# Patient Record
Sex: MALE | Race: WHITE | Employment: FULL TIME | URBAN - METROPOLITAN AREA
[De-identification: names, ages, dates, MRNs, and addresses within clinical notes are randomized per-mention and may not be internally consistent; named-entity substitution may affect disease eponyms.]

---

## 2017-01-10 ENCOUNTER — OFFICE VISIT (OUTPATIENT)
Dept: FAMILY MEDICINE CLINIC | Facility: CLINIC | Age: 42
End: 2017-01-10

## 2017-01-10 VITALS
WEIGHT: 186.63 LBS | BODY MASS INDEX: 31.09 KG/M2 | HEART RATE: 58 BPM | SYSTOLIC BLOOD PRESSURE: 117 MMHG | HEIGHT: 65 IN | TEMPERATURE: 97 F | DIASTOLIC BLOOD PRESSURE: 71 MMHG

## 2017-01-10 DIAGNOSIS — Z00.00 ROUTINE MEDICAL EXAM: Primary | ICD-10-CM

## 2017-01-10 DIAGNOSIS — R10.30 LOWER ABDOMINAL PAIN: ICD-10-CM

## 2017-01-10 DIAGNOSIS — L83 ACANTHOSIS NIGRICANS: ICD-10-CM

## 2017-01-10 PROCEDURE — 90471 IMMUNIZATION ADMIN: CPT | Performed by: FAMILY MEDICINE

## 2017-01-10 PROCEDURE — 90715 TDAP VACCINE 7 YRS/> IM: CPT | Performed by: FAMILY MEDICINE

## 2017-01-10 PROCEDURE — 99386 PREV VISIT NEW AGE 40-64: CPT | Performed by: FAMILY MEDICINE

## 2017-01-10 NOTE — PROGRESS NOTES
Cydney Carrera is a 37year old male who presents for a complete physical exam.   HPI:   Pt here for regular physical. Reports problems with pain in lower abdomen. No constipation. No diarrhea. Feeling good overall. Reports doing no exercises.  Reports tenderness   MUSCULOSKELETAL: back is not tender,FROM of the back  EXTREMITIES: no cyanosis, clubbing or edema  NEURO: Oriented times three,cranial nerves are intact,motor and sensory are grossly intact    ASSESSMENT AND PLAN:   Jazz Randall is a 37

## 2017-01-14 ENCOUNTER — LAB ENCOUNTER (OUTPATIENT)
Dept: LAB | Age: 42
End: 2017-01-14
Attending: FAMILY MEDICINE
Payer: COMMERCIAL

## 2017-01-14 DIAGNOSIS — R10.30 LOWER ABDOMINAL PAIN: ICD-10-CM

## 2017-01-14 DIAGNOSIS — L83 ACANTHOSIS NIGRICANS: ICD-10-CM

## 2017-01-14 DIAGNOSIS — Z00.00 ROUTINE MEDICAL EXAM: ICD-10-CM

## 2017-01-14 LAB
ALBUMIN SERPL BCP-MCNC: 4.1 G/DL (ref 3.5–4.8)
ALBUMIN/GLOB SERPL: 1.5 {RATIO} (ref 1–2)
ALP SERPL-CCNC: 80 U/L (ref 32–100)
ALT SERPL-CCNC: 26 U/L (ref 17–63)
ANION GAP SERPL CALC-SCNC: 8 MMOL/L (ref 0–18)
AST SERPL-CCNC: 21 U/L (ref 15–41)
BACTERIA UR QL AUTO: NEGATIVE /HPF
BASOPHILS # BLD: 0 K/UL (ref 0–0.2)
BASOPHILS NFR BLD: 1 %
BILIRUB SERPL-MCNC: 1.2 MG/DL (ref 0.3–1.2)
BILIRUB UR QL: NEGATIVE
BUN SERPL-MCNC: 11 MG/DL (ref 8–20)
BUN/CREAT SERPL: 10.9 (ref 10–20)
CALCIUM SERPL-MCNC: 9.1 MG/DL (ref 8.5–10.5)
CHLORIDE SERPL-SCNC: 106 MMOL/L (ref 95–110)
CHOLEST SERPL-MCNC: 162 MG/DL (ref 110–200)
CLARITY UR: CLEAR
CO2 SERPL-SCNC: 24 MMOL/L (ref 22–32)
COLOR UR: YELLOW
CREAT SERPL-MCNC: 1.01 MG/DL (ref 0.5–1.5)
EOSINOPHIL # BLD: 0.2 K/UL (ref 0–0.7)
EOSINOPHIL NFR BLD: 4 %
ERYTHROCYTE [DISTWIDTH] IN BLOOD BY AUTOMATED COUNT: 13 % (ref 11–15)
GLOBULIN PLAS-MCNC: 2.7 G/DL (ref 2.5–3.7)
GLUCOSE SERPL-MCNC: 94 MG/DL (ref 70–99)
GLUCOSE UR-MCNC: NEGATIVE MG/DL
HCT VFR BLD AUTO: 44.8 % (ref 41–52)
HDLC SERPL-MCNC: 41 MG/DL
HGB BLD-MCNC: 15.2 G/DL (ref 13.5–17.5)
LDLC SERPL CALC-MCNC: 104 MG/DL (ref 0–99)
LEUKOCYTE ESTERASE UR QL STRIP.AUTO: NEGATIVE
LYMPHOCYTES # BLD: 1.5 K/UL (ref 1–4)
LYMPHOCYTES NFR BLD: 30 %
MCH RBC QN AUTO: 31.2 PG (ref 27–32)
MCHC RBC AUTO-ENTMCNC: 34 G/DL (ref 32–37)
MCV RBC AUTO: 91.8 FL (ref 80–100)
MONOCYTES # BLD: 0.4 K/UL (ref 0–1)
MONOCYTES NFR BLD: 8 %
NEUTROPHILS # BLD AUTO: 3 K/UL (ref 1.8–7.7)
NEUTROPHILS NFR BLD: 59 %
NITRITE UR QL STRIP.AUTO: NEGATIVE
NONHDLC SERPL-MCNC: 121 MG/DL
OSMOLALITY UR CALC.SUM OF ELEC: 285 MOSM/KG (ref 275–295)
PH UR: 5 [PH] (ref 5–8)
PLATELET # BLD AUTO: 219 K/UL (ref 140–400)
PMV BLD AUTO: 8.5 FL (ref 7.4–10.3)
POTASSIUM SERPL-SCNC: 3.8 MMOL/L (ref 3.3–5.1)
PROT SERPL-MCNC: 6.8 G/DL (ref 5.9–8.4)
PROT UR-MCNC: NEGATIVE MG/DL
RBC # BLD AUTO: 4.88 M/UL (ref 4.5–5.9)
RBC #/AREA URNS AUTO: 4 /HPF
SODIUM SERPL-SCNC: 138 MMOL/L (ref 136–144)
SP GR UR STRIP: 1.02 (ref 1–1.03)
TRIGL SERPL-MCNC: 86 MG/DL (ref 1–149)
TSH SERPL-ACNC: 1.84 UIU/ML (ref 0.34–5.6)
UROBILINOGEN UR STRIP-ACNC: <2
VIT C UR-MCNC: NEGATIVE MG/DL
WBC # BLD AUTO: 5.1 K/UL (ref 4–11)
WBC #/AREA URNS AUTO: <1 /HPF

## 2017-01-14 PROCEDURE — 36415 COLL VENOUS BLD VENIPUNCTURE: CPT

## 2017-01-14 PROCEDURE — 81001 URINALYSIS AUTO W/SCOPE: CPT

## 2017-01-14 PROCEDURE — 85025 COMPLETE CBC W/AUTO DIFF WBC: CPT

## 2017-01-14 PROCEDURE — 83036 HEMOGLOBIN GLYCOSYLATED A1C: CPT

## 2017-01-14 PROCEDURE — 80053 COMPREHEN METABOLIC PANEL: CPT

## 2017-01-14 PROCEDURE — 84443 ASSAY THYROID STIM HORMONE: CPT

## 2017-01-14 PROCEDURE — 80061 LIPID PANEL: CPT

## 2017-01-15 LAB — HBA1C MFR BLD: 5.5 % (ref 4–6)

## 2017-01-19 ENCOUNTER — TELEPHONE (OUTPATIENT)
Dept: FAMILY MEDICINE CLINIC | Facility: CLINIC | Age: 42
End: 2017-01-19

## 2017-01-19 ENCOUNTER — APPOINTMENT (OUTPATIENT)
Dept: LAB | Age: 42
End: 2017-01-19
Attending: FAMILY MEDICINE
Payer: COMMERCIAL

## 2017-01-19 DIAGNOSIS — R31.9 HEMATURIA: ICD-10-CM

## 2017-01-19 DIAGNOSIS — R31.9 HEMATURIA: Primary | ICD-10-CM

## 2017-01-19 LAB
BACTERIA UR QL AUTO: NEGATIVE /HPF
BILIRUB UR QL: NEGATIVE
CLARITY UR: CLEAR
COLOR UR: COLORLESS
GLUCOSE UR-MCNC: NEGATIVE MG/DL
KETONES UR-MCNC: NEGATIVE MG/DL
LEUKOCYTE ESTERASE UR QL STRIP.AUTO: NEGATIVE
NITRITE UR QL STRIP.AUTO: NEGATIVE
PH UR: 6 [PH] (ref 5–8)
PROT UR-MCNC: NEGATIVE MG/DL
RBC #/AREA URNS AUTO: 0 /HPF
SP GR UR STRIP: 1 (ref 1–1.03)
UROBILINOGEN UR STRIP-ACNC: <2
VIT C UR-MCNC: NEGATIVE MG/DL
WBC #/AREA URNS AUTO: 0 /HPF

## 2017-01-19 PROCEDURE — 87086 URINE CULTURE/COLONY COUNT: CPT

## 2017-01-19 PROCEDURE — 81001 URINALYSIS AUTO W/SCOPE: CPT

## 2017-01-19 NOTE — TELEPHONE ENCOUNTER
----- Message from Ramone Adames MD sent at 1/19/2017 10:24 AM CST -----  Normal glucose and no diabetes but at risk given family history. Should make dietary changes as discussed and regular exercise. Overall cholesterol was good.  There was some blood

## 2017-01-19 NOTE — PROGRESS NOTES
Quick Note:    Normal glucose and no diabetes but at risk given family history. Should make dietary changes as discussed and regular exercise. Overall cholesterol was good. There was some blood in his urine. Please have repeat urine - UA and culture.  Dx he

## 2017-01-19 NOTE — TELEPHONE ENCOUNTER
assist:    Pt name &  verified. Result note reviewed per Dr. Gordo Barry. Pt verbalized understanding and denied any further questions at this time.

## 2017-01-19 NOTE — TELEPHONE ENCOUNTER
----- Message from Chares Kehr, MD sent at 1/19/2017 10:24 AM CST -----  Normal glucose and no diabetes but at risk given family history. Should make dietary changes as discussed and regular exercise. Overall cholesterol was good.  There was some blood

## 2017-01-20 NOTE — PROGRESS NOTES
Quick Note:    Tests are all normal.repeat urine was normal. No significant blood.  No further testing needed  ______

## 2017-01-26 ENCOUNTER — TELEPHONE (OUTPATIENT)
Dept: FAMILY MEDICINE CLINIC | Facility: CLINIC | Age: 42
End: 2017-01-26

## 2017-01-26 NOTE — TELEPHONE ENCOUNTER
----- Message from Ad Quispe MD sent at 1/20/2017  6:58 PM CST -----  Tests are all normal. Urine culture negative for bacteria

## 2017-04-11 ENCOUNTER — APPOINTMENT (OUTPATIENT)
Dept: OTHER | Facility: HOSPITAL | Age: 42
End: 2017-04-11
Attending: ORTHOPAEDIC SURGERY
Payer: COMMERCIAL

## 2017-04-19 ENCOUNTER — APPOINTMENT (OUTPATIENT)
Dept: OTHER | Facility: HOSPITAL | Age: 42
End: 2017-04-19
Attending: EMERGENCY MEDICINE

## 2017-07-12 ENCOUNTER — OFFICE VISIT (OUTPATIENT)
Dept: FAMILY MEDICINE CLINIC | Facility: CLINIC | Age: 42
End: 2017-07-12

## 2017-07-12 VITALS
SYSTOLIC BLOOD PRESSURE: 115 MMHG | BODY MASS INDEX: 31 KG/M2 | DIASTOLIC BLOOD PRESSURE: 66 MMHG | WEIGHT: 187.63 LBS | HEART RATE: 62 BPM

## 2017-07-12 DIAGNOSIS — H60.501 ACUTE OTITIS EXTERNA OF RIGHT EAR, UNSPECIFIED TYPE: Primary | ICD-10-CM

## 2017-07-12 PROCEDURE — 99212 OFFICE O/P EST SF 10 MIN: CPT | Performed by: FAMILY MEDICINE

## 2017-07-12 PROCEDURE — 99213 OFFICE O/P EST LOW 20 MIN: CPT | Performed by: FAMILY MEDICINE

## 2017-07-12 RX ORDER — NEOMYCIN SULFATE, POLYMYXIN B SULFATE AND HYDROCORTISONE 10; 3.5; 1 MG/ML; MG/ML; [USP'U]/ML
4 SUSPENSION/ DROPS AURICULAR (OTIC) 3 TIMES DAILY
Qty: 1 BOTTLE | Refills: 0 | Status: SHIPPED | OUTPATIENT
Start: 2017-07-12 | End: 2018-03-05

## 2017-07-12 RX ORDER — AMOXICILLIN 875 MG/1
875 TABLET, COATED ORAL 2 TIMES DAILY
Qty: 20 TABLET | Refills: 0 | Status: SHIPPED | OUTPATIENT
Start: 2017-07-12 | End: 2017-07-22

## 2017-07-12 NOTE — PROGRESS NOTES
Anum Fitzpatrick is a 37year old male. Patient presents with:  Ear Problem    HPI:   3 days ago started with right ear pain and throat pain. Can barely eat now because pain is so bad. No current outpatient prescriptions on file prior to visit.   No

## 2018-03-05 ENCOUNTER — OFFICE VISIT (OUTPATIENT)
Dept: FAMILY MEDICINE CLINIC | Facility: CLINIC | Age: 43
End: 2018-03-05

## 2018-03-05 VITALS
DIASTOLIC BLOOD PRESSURE: 80 MMHG | TEMPERATURE: 98 F | HEIGHT: 65 IN | SYSTOLIC BLOOD PRESSURE: 130 MMHG | BODY MASS INDEX: 32.49 KG/M2 | HEART RATE: 62 BPM | WEIGHT: 195 LBS

## 2018-03-05 DIAGNOSIS — S46.811A STRAIN OF RIGHT TRAPEZIUS MUSCLE, INITIAL ENCOUNTER: ICD-10-CM

## 2018-03-05 DIAGNOSIS — M54.2 NECK PAIN ON RIGHT SIDE: ICD-10-CM

## 2018-03-05 DIAGNOSIS — G44.209 TENSION HEADACHE: Primary | ICD-10-CM

## 2018-03-05 DIAGNOSIS — Z72.820 POOR SLEEP: ICD-10-CM

## 2018-03-05 PROCEDURE — 99214 OFFICE O/P EST MOD 30 MIN: CPT | Performed by: FAMILY MEDICINE

## 2018-03-05 PROCEDURE — 99212 OFFICE O/P EST SF 10 MIN: CPT | Performed by: FAMILY MEDICINE

## 2018-03-05 RX ORDER — DOXEPIN HYDROCHLORIDE 50 MG/1
1 CAPSULE ORAL DAILY
Qty: 30 TABLET | Refills: 11 | Status: SHIPPED | OUTPATIENT
Start: 2018-03-05 | End: 2019-03-16 | Stop reason: ALTCHOICE

## 2018-03-05 RX ORDER — MELOXICAM 15 MG/1
15 TABLET ORAL DAILY
Qty: 30 TABLET | Refills: 1 | Status: SHIPPED | OUTPATIENT
Start: 2018-03-05 | End: 2018-04-04

## 2018-03-05 RX ORDER — CYCLOBENZAPRINE HCL 10 MG
10 TABLET ORAL NIGHTLY
Qty: 30 TABLET | Refills: 1 | Status: SHIPPED | OUTPATIENT
Start: 2018-03-05 | End: 2018-03-25

## 2018-03-06 NOTE — PROGRESS NOTES
Patient ID: Jay Stone is a 40year old male.     HPI  Patient presents with:  Headache  Neck Pain  Throat Problem    He states for 15 days now he has had some right sided headache at the right parietal area that goes to his right neck and right t Tympanic membrane and ear canal normal.   Left Ear: Tympanic membrane and ear canal normal.   Nose: No mucosal edema.    Mouth/Throat: Oropharynx is clear and moist and mucous membranes are normal.   Eyes: Conjunctivae and EOM are normal. Pupils are equal, Oral Tab; Take 1 tablet (15 mg total) by mouth daily. With meals. (pain/inflammation). Strain of right trapezius muscle, initial encounter  -     Cyclobenzaprine HCl 10 MG Oral Tab; Take 1 tablet (10 mg total) by mouth nightly.  As needed for muscle rela

## 2018-03-12 ENCOUNTER — APPOINTMENT (OUTPATIENT)
Dept: GENERAL RADIOLOGY | Age: 43
End: 2018-03-12
Attending: NURSE PRACTITIONER
Payer: COMMERCIAL

## 2018-03-12 ENCOUNTER — HOSPITAL ENCOUNTER (OUTPATIENT)
Age: 43
Discharge: HOME | End: 2018-03-12
Payer: COMMERCIAL

## 2018-03-12 VITALS
DIASTOLIC BLOOD PRESSURE: 85 MMHG | TEMPERATURE: 98 F | WEIGHT: 196 LBS | OXYGEN SATURATION: 99 % | RESPIRATION RATE: 16 BRPM | HEART RATE: 68 BPM | SYSTOLIC BLOOD PRESSURE: 134 MMHG | BODY MASS INDEX: 33 KG/M2

## 2018-03-12 DIAGNOSIS — J40 BRONCHITIS: Primary | ICD-10-CM

## 2018-03-12 LAB — S PYO AG THROAT QL: NEGATIVE

## 2018-03-12 PROCEDURE — 99213 OFFICE O/P EST LOW 20 MIN: CPT

## 2018-03-12 PROCEDURE — 87430 STREP A AG IA: CPT

## 2018-03-12 PROCEDURE — 71046 X-RAY EXAM CHEST 2 VIEWS: CPT | Performed by: NURSE PRACTITIONER

## 2018-03-12 PROCEDURE — 99203 OFFICE O/P NEW LOW 30 MIN: CPT

## 2018-03-12 RX ORDER — ALBUTEROL SULFATE 90 UG/1
2 AEROSOL, METERED RESPIRATORY (INHALATION) EVERY 4 HOURS PRN
Qty: 1 INHALER | Refills: 0 | Status: SHIPPED | OUTPATIENT
Start: 2018-03-12 | End: 2018-04-11

## 2018-03-12 RX ORDER — PREDNISONE 20 MG/1
40 TABLET ORAL DAILY
Qty: 10 TABLET | Refills: 0 | Status: SHIPPED | OUTPATIENT
Start: 2018-03-12 | End: 2018-03-17

## 2018-03-12 RX ORDER — BENZONATATE 100 MG/1
100 CAPSULE ORAL 3 TIMES DAILY PRN
Qty: 30 CAPSULE | Refills: 0 | Status: SHIPPED | OUTPATIENT
Start: 2018-03-12 | End: 2018-04-11

## 2018-03-12 NOTE — ED PROVIDER NOTES
Patient presents with:  Sore Throat  Cough/URI      HPI:     Ky Delgado is a 40year old male who presents with sore throat and productive cough. Patient reports symptoms started 7 days ago.   Now reports cough is more productive of yellow sputum Patient is nontoxic in appearance, afebrile, lungs clear bilaterally with easy respirations. Exam as noted above. Will treat patient with bronchitis with tessalon perles, prednisone and albuterol inhaler.  Pt to follow up with PCP and return for any other

## 2018-03-12 NOTE — ED INITIAL ASSESSMENT (HPI)
1 week with cold symptoms, sore throat, denies fever. Saw Dr. Rex Thacker about shortly before that for a nerve issue in his neck.   Much improved

## 2019-03-16 ENCOUNTER — LAB ENCOUNTER (OUTPATIENT)
Dept: LAB | Age: 44
End: 2019-03-16
Attending: FAMILY MEDICINE
Payer: COMMERCIAL

## 2019-03-16 ENCOUNTER — OFFICE VISIT (OUTPATIENT)
Dept: FAMILY MEDICINE CLINIC | Facility: CLINIC | Age: 44
End: 2019-03-16
Payer: COMMERCIAL

## 2019-03-16 VITALS
DIASTOLIC BLOOD PRESSURE: 72 MMHG | BODY MASS INDEX: 33.09 KG/M2 | WEIGHT: 198.63 LBS | HEART RATE: 77 BPM | SYSTOLIC BLOOD PRESSURE: 123 MMHG | HEIGHT: 65 IN

## 2019-03-16 DIAGNOSIS — G47.00 INSOMNIA, UNSPECIFIED TYPE: ICD-10-CM

## 2019-03-16 DIAGNOSIS — G44.209 TENSION HEADACHE: ICD-10-CM

## 2019-03-16 DIAGNOSIS — Z00.00 ROUTINE MEDICAL EXAM: Primary | ICD-10-CM

## 2019-03-16 DIAGNOSIS — Z00.00 ROUTINE MEDICAL EXAM: ICD-10-CM

## 2019-03-16 LAB
ALBUMIN SERPL-MCNC: 4 G/DL (ref 3.4–5)
ALBUMIN/GLOB SERPL: 1.3 {RATIO} (ref 1–2)
ALP LIVER SERPL-CCNC: 110 U/L (ref 45–117)
ALT SERPL-CCNC: 63 U/L (ref 16–61)
ANION GAP SERPL CALC-SCNC: 6 MMOL/L (ref 0–18)
AST SERPL-CCNC: 19 U/L (ref 15–37)
BASOPHILS # BLD AUTO: 0.05 X10(3) UL (ref 0–0.2)
BASOPHILS NFR BLD AUTO: 0.8 %
BILIRUB SERPL-MCNC: 0.6 MG/DL (ref 0.1–2)
BUN BLD-MCNC: 18 MG/DL (ref 7–18)
BUN/CREAT SERPL: 18 (ref 10–20)
CALCIUM BLD-MCNC: 8.8 MG/DL (ref 8.5–10.1)
CHLORIDE SERPL-SCNC: 110 MMOL/L (ref 98–107)
CHOLEST SMN-MCNC: 174 MG/DL (ref ?–200)
CO2 SERPL-SCNC: 27 MMOL/L (ref 21–32)
CREAT BLD-MCNC: 1 MG/DL (ref 0.7–1.3)
DEPRECATED RDW RBC AUTO: 42 FL (ref 35.1–46.3)
EOSINOPHIL # BLD AUTO: 0.18 X10(3) UL (ref 0–0.7)
EOSINOPHIL NFR BLD AUTO: 2.9 %
ERYTHROCYTE [DISTWIDTH] IN BLOOD BY AUTOMATED COUNT: 12.3 % (ref 11–15)
GLOBULIN PLAS-MCNC: 3.2 G/DL (ref 2.8–4.4)
GLUCOSE BLD-MCNC: 143 MG/DL (ref 70–99)
HCT VFR BLD AUTO: 45.5 % (ref 39–53)
HDLC SERPL-MCNC: 40 MG/DL (ref 40–59)
HGB BLD-MCNC: 14.9 G/DL (ref 13–17.5)
IMM GRANULOCYTES # BLD AUTO: 0.02 X10(3) UL (ref 0–1)
IMM GRANULOCYTES NFR BLD: 0.3 %
LDLC SERPL CALC-MCNC: 93 MG/DL (ref ?–100)
LYMPHOCYTES # BLD AUTO: 1.95 X10(3) UL (ref 1–4)
LYMPHOCYTES NFR BLD AUTO: 31.4 %
M PROTEIN MFR SERPL ELPH: 7.2 G/DL (ref 6.4–8.2)
MCH RBC QN AUTO: 30.5 PG (ref 26–34)
MCHC RBC AUTO-ENTMCNC: 32.7 G/DL (ref 31–37)
MCV RBC AUTO: 93 FL (ref 80–100)
MONOCYTES # BLD AUTO: 0.39 X10(3) UL (ref 0.1–1)
MONOCYTES NFR BLD AUTO: 6.3 %
NEUTROPHILS # BLD AUTO: 3.63 X10 (3) UL (ref 1.5–7.7)
NEUTROPHILS # BLD AUTO: 3.63 X10(3) UL (ref 1.5–7.7)
NEUTROPHILS NFR BLD AUTO: 58.3 %
NONHDLC SERPL-MCNC: 134 MG/DL (ref ?–130)
OSMOLALITY SERPL CALC.SUM OF ELEC: 300 MOSM/KG (ref 275–295)
PLATELET # BLD AUTO: 234 10(3)UL (ref 150–450)
POTASSIUM SERPL-SCNC: 3.8 MMOL/L (ref 3.5–5.1)
RBC # BLD AUTO: 4.89 X10(6)UL (ref 4.3–5.7)
SODIUM SERPL-SCNC: 143 MMOL/L (ref 136–145)
TRIGL SERPL-MCNC: 205 MG/DL (ref 30–149)
TSI SER-ACNC: 1.32 MIU/ML (ref 0.36–3.74)
VIT B12 SERPL-MCNC: 605 PG/ML (ref 193–986)
VLDLC SERPL CALC-MCNC: 41 MG/DL (ref 0–30)
WBC # BLD AUTO: 6.2 X10(3) UL (ref 4–11)

## 2019-03-16 PROCEDURE — 36415 COLL VENOUS BLD VENIPUNCTURE: CPT

## 2019-03-16 PROCEDURE — 99396 PREV VISIT EST AGE 40-64: CPT | Performed by: FAMILY MEDICINE

## 2019-03-16 PROCEDURE — 82607 VITAMIN B-12: CPT

## 2019-03-16 PROCEDURE — 82306 VITAMIN D 25 HYDROXY: CPT

## 2019-03-16 PROCEDURE — 80061 LIPID PANEL: CPT

## 2019-03-16 PROCEDURE — 85025 COMPLETE CBC W/AUTO DIFF WBC: CPT

## 2019-03-16 PROCEDURE — 80053 COMPREHEN METABOLIC PANEL: CPT

## 2019-03-16 PROCEDURE — 84443 ASSAY THYROID STIM HORMONE: CPT

## 2019-03-16 RX ORDER — ROPINIROLE 1 MG/1
1 TABLET, FILM COATED ORAL NIGHTLY
Qty: 30 TABLET | Refills: 1 | Status: SHIPPED | OUTPATIENT
Start: 2019-03-16 | End: 2019-04-10 | Stop reason: ALTCHOICE

## 2019-03-16 RX ORDER — MAGNESIUM 200 MG
1000 TABLET ORAL DAILY
Qty: 90 TABLET | Refills: 1 | Status: SHIPPED | OUTPATIENT
Start: 2019-03-16 | End: 2019-04-10 | Stop reason: ALTCHOICE

## 2019-03-16 RX ORDER — CHOLECALCIFEROL (VITAMIN D3) 50 MCG
2000 TABLET ORAL DAILY
Qty: 90 TABLET | Refills: 4 | Status: SHIPPED | OUTPATIENT
Start: 2019-03-16 | End: 2019-04-10 | Stop reason: ALTCHOICE

## 2019-03-16 NOTE — PROGRESS NOTES
Milena Edwards is a 44year old male who presents for a complete physical exam.   HPI:   Pt here for regular physical. Reports eating healthy per pt. Reports eating salads. Not exercising. Reports some pain with working in his legs.    Not sleeping w tenderness  LUNGS: clear to auscultation  CARDIO: RRR without murmur  GI: good BS's,no masses, HSM or tenderness  MUSCULOSKELETAL: back is not tender,FROM of the back  EXTREMITIES: no cyanosis, clubbing or edema  NEURO: Oriented times three,cranial nerves

## 2019-03-18 LAB — 25(OH)D3 SERPL-MCNC: 14.9 NG/ML (ref 30–100)

## 2019-03-25 NOTE — PROGRESS NOTES
Vitamin D levels are low. Pt to take weekly vit d 50,000 for 3 months and when complete continue with the vitamin D daily over the counter 2000 units. And pt has high glucose - levels of someone that is diabetic.  Pt to go for another lab -called hemoglob

## 2019-03-25 NOTE — PROGRESS NOTES
Should stop the 2000 for now and restart when done with 3 months of vitamin d high dose.  Continue B12

## 2019-03-27 ENCOUNTER — LAB ENCOUNTER (OUTPATIENT)
Dept: LAB | Age: 44
End: 2019-03-27
Attending: FAMILY MEDICINE
Payer: COMMERCIAL

## 2019-03-27 DIAGNOSIS — R73.09 ABNORMAL GLUCOSE: ICD-10-CM

## 2019-03-27 PROCEDURE — 83036 HEMOGLOBIN GLYCOSYLATED A1C: CPT

## 2019-03-27 PROCEDURE — 36415 COLL VENOUS BLD VENIPUNCTURE: CPT

## 2019-04-02 NOTE — PROGRESS NOTES
Pt is pre-diabetic - does not have diabetes yet but needs to make sure changes to diet to avoid it. Smaller portions - more lean meats, veggies. Less carbs.

## 2019-04-10 ENCOUNTER — OFFICE VISIT (OUTPATIENT)
Dept: FAMILY MEDICINE CLINIC | Facility: CLINIC | Age: 44
End: 2019-04-10
Payer: COMMERCIAL

## 2019-04-10 VITALS
HEART RATE: 58 BPM | DIASTOLIC BLOOD PRESSURE: 63 MMHG | BODY MASS INDEX: 33.19 KG/M2 | WEIGHT: 199.19 LBS | SYSTOLIC BLOOD PRESSURE: 106 MMHG | HEIGHT: 65 IN

## 2019-04-10 DIAGNOSIS — E56.9 VITAMIN DEFICIENCY: ICD-10-CM

## 2019-04-10 DIAGNOSIS — R73.03 PREDIABETES: Primary | ICD-10-CM

## 2019-04-10 PROCEDURE — 99212 OFFICE O/P EST SF 10 MIN: CPT | Performed by: FAMILY MEDICINE

## 2019-04-10 PROCEDURE — 99213 OFFICE O/P EST LOW 20 MIN: CPT | Performed by: FAMILY MEDICINE

## 2019-04-10 RX ORDER — CHOLECALCIFEROL (VITAMIN D3) 1250 MCG
1 CAPSULE ORAL WEEKLY
Qty: 12 CAPSULE | Refills: 1 | Status: SHIPPED | OUTPATIENT
Start: 2019-04-10 | End: 2020-01-27 | Stop reason: ALTCHOICE

## 2019-04-10 NOTE — PROGRESS NOTES
Nicole Siegel is a 44year old male. Patient presents with:  Lab Results    HPI:   Here for follow up on his lab results.    Reports feeling a bit better     Current Outpatient Medications on File Prior to Visit:  Cholecalciferol (VITAMIN D3) 46647 un

## 2020-01-25 ENCOUNTER — HOSPITAL ENCOUNTER (EMERGENCY)
Facility: HOSPITAL | Age: 45
Discharge: HOME | End: 2020-01-25
Attending: EMERGENCY MEDICINE
Payer: COMMERCIAL

## 2020-01-25 ENCOUNTER — APPOINTMENT (OUTPATIENT)
Dept: GENERAL RADIOLOGY | Facility: HOSPITAL | Age: 45
End: 2020-01-25
Attending: EMERGENCY MEDICINE
Payer: COMMERCIAL

## 2020-01-25 VITALS
WEIGHT: 188 LBS | RESPIRATION RATE: 18 BRPM | DIASTOLIC BLOOD PRESSURE: 79 MMHG | HEIGHT: 65 IN | BODY MASS INDEX: 31.32 KG/M2 | TEMPERATURE: 98 F | SYSTOLIC BLOOD PRESSURE: 121 MMHG | HEART RATE: 56 BPM | OXYGEN SATURATION: 99 %

## 2020-01-25 DIAGNOSIS — S61.412A LACERATION OF LEFT HAND WITHOUT FOREIGN BODY, INITIAL ENCOUNTER: Primary | ICD-10-CM

## 2020-01-25 PROCEDURE — 12032 INTMD RPR S/A/T/EXT 2.6-7.5: CPT

## 2020-01-25 PROCEDURE — 90471 IMMUNIZATION ADMIN: CPT

## 2020-01-25 PROCEDURE — 99283 EMERGENCY DEPT VISIT LOW MDM: CPT

## 2020-01-25 PROCEDURE — 73130 X-RAY EXAM OF HAND: CPT | Performed by: EMERGENCY MEDICINE

## 2020-01-25 RX ORDER — LIDOCAINE HYDROCHLORIDE AND EPINEPHRINE 20; 5 MG/ML; UG/ML
20 INJECTION, SOLUTION EPIDURAL; INFILTRATION; INTRACAUDAL; PERINEURAL ONCE
Status: COMPLETED | OUTPATIENT
Start: 2020-01-25 | End: 2020-01-25

## 2020-01-25 NOTE — ED NOTES
Discharge instructions given to patient. Verbalized understanding. Patient in no distress.   Patient left ED ambulatory steady gait

## 2020-01-25 NOTE — ED PROVIDER NOTES
Patient Seen in: [facility] AND [facility] Emergency Department      History   Patient presents with:  Laceration Abrasion    Stated Complaint:     HPI    30-year-old male with past medical history significant for prediabetes presents the emergency department fo pulses  Pulmonary/Chest: CTA b/l with no rales, wheezes. No chest wall tenderness  Abdominal: Nontender. Nondistended. Soft. Bowel sounds are normal.   Back:   : Musculoskeletal: Left hand with normal range of motion. No deformity.   There is a 5 cm l Prescribed:  Current Discharge Medication List

## 2020-01-27 ENCOUNTER — OFFICE VISIT (OUTPATIENT)
Dept: FAMILY MEDICINE CLINIC | Facility: CLINIC | Age: 45
End: 2020-01-27
Payer: COMMERCIAL

## 2020-01-27 ENCOUNTER — TELEPHONE (OUTPATIENT)
Dept: FAMILY MEDICINE CLINIC | Facility: CLINIC | Age: 45
End: 2020-01-27

## 2020-01-27 VITALS
DIASTOLIC BLOOD PRESSURE: 62 MMHG | HEART RATE: 65 BPM | BODY MASS INDEX: 32.49 KG/M2 | WEIGHT: 195 LBS | HEIGHT: 65 IN | SYSTOLIC BLOOD PRESSURE: 107 MMHG

## 2020-01-27 DIAGNOSIS — S61.429D: Primary | ICD-10-CM

## 2020-01-27 PROCEDURE — 99213 OFFICE O/P EST LOW 20 MIN: CPT | Performed by: FAMILY MEDICINE

## 2020-01-27 PROCEDURE — 1111F DSCHRG MED/CURRENT MED MERGE: CPT | Performed by: FAMILY MEDICINE

## 2020-01-27 RX ORDER — AMOXICILLIN AND CLAVULANATE POTASSIUM 875; 125 MG/1; MG/1
1 TABLET, FILM COATED ORAL 2 TIMES DAILY
Qty: 20 TABLET | Refills: 0 | Status: SHIPPED | OUTPATIENT
Start: 2020-01-27 | End: 2020-02-06

## 2020-01-27 NOTE — TELEPHONE ENCOUNTER
ER 1/25/2020. Moved table, cut hand on glass. Left hand dorsum 5 cm wound. Today patient said hand feels more swollen than on Saturday, able to close hand Saturday but cannot today. Also had pain Saturday but today has pain in fingers too.  Left hand is

## 2020-01-27 NOTE — PROGRESS NOTES
Ban Sales is a 38year old male.  Patient presents with:  Hospital F/U: left hand injury     HPI:   \" 52-year-old male with past medical history significant for prediabetes presents the emergency department for evaluation of laceration to his lef these issues and agrees to the plan.       Hanna Gray MD  1/27/2020  2:21 PM

## 2020-02-03 ENCOUNTER — OFFICE VISIT (OUTPATIENT)
Dept: SURGERY | Facility: CLINIC | Age: 45
End: 2020-02-03
Payer: COMMERCIAL

## 2020-02-03 ENCOUNTER — OFFICE VISIT (OUTPATIENT)
Dept: FAMILY MEDICINE CLINIC | Facility: CLINIC | Age: 45
End: 2020-02-03
Payer: COMMERCIAL

## 2020-02-03 VITALS
SYSTOLIC BLOOD PRESSURE: 131 MMHG | HEIGHT: 65 IN | DIASTOLIC BLOOD PRESSURE: 78 MMHG | HEART RATE: 89 BPM | BODY MASS INDEX: 32 KG/M2

## 2020-02-03 DIAGNOSIS — S61.412D LACERATION OF LEFT HAND WITHOUT FOREIGN BODY, SUBSEQUENT ENCOUNTER: Primary | ICD-10-CM

## 2020-02-03 DIAGNOSIS — S61.412A LACERATION WITHOUT FOREIGN BODY OF LEFT HAND, INITIAL ENCOUNTER: Primary | ICD-10-CM

## 2020-02-03 DIAGNOSIS — S61.412A LACERATION OF FINGER OF LEFT HAND WITH COMPLICATION, INITIAL ENCOUNTER: Primary | ICD-10-CM

## 2020-02-03 PROCEDURE — 29125 APPL SHORT ARM SPLINT STATIC: CPT | Performed by: OCCUPATIONAL THERAPIST

## 2020-02-03 PROCEDURE — 99243 OFF/OP CNSLTJ NEW/EST LOW 30: CPT | Performed by: PLASTIC SURGERY

## 2020-02-03 PROCEDURE — 99213 OFFICE O/P EST LOW 20 MIN: CPT | Performed by: NURSE PRACTITIONER

## 2020-02-03 NOTE — PROGRESS NOTES
Subjective: A piece of glass cut my hand. Objective:     Current level of performance:   ADL: Independent  Work: Restricted work duty. Leisure: Not addressed.     Measurements/Tests:  ROM:         N/A         Treatment Provided this day: Fabricated a

## 2020-02-03 NOTE — PATIENT INSTRUCTIONS
Laceración de la mano con posible lesión nerviosa (suturas, pegamento para piel)    Becky laceración es becky cortada. Es posible que becky laceración de la mano lesione un nervio.  Karina tipo de lesión puede ocasionar entumecimiento, pérdida de sensación y pao Cuidados generales:  · Siga las instrucciones de jesus proveedor de atención médica para cuidar de jesus cortada. · [employer] las hi con Qu[place]ult y jabón antes y después de cuidar la herida.  Carolina ayuda a evitar tracee infección.    · Deje el vendaje original · Si la claudia se humedece, séquela dándole golpecitos con un paño limpio. Luego, cambie el vendaje humedecido por otro Pervis Pagoda de control  Programe tracee visita de control con jesus proveedor de [employer].  Es importante que lo examinen nuevamente par 3. Puede ducharse y bañarse de la manera habitual. Ahora sí puede ir a nadar si lo desea. Programe tracee VISITA DE CONTROL con jesus propio médico si tiene algún problema.   Busque Prontamente Atención Médica  si algo de lo siguiente ocurre:  · Dolor que aument

## 2020-02-03 NOTE — PROGRESS NOTES
HPI  Pt here for removal of 8 sutures from left hand. Was seen in ER on 1/25/2020-cut hand on glass windmill falling on hand. 8 external and 1 internal sutures placed.      Review of Systems     02/03/20  0842   BP: 131/78   Pulse: 89   Height: 5' 5\" (1. Forced sexual activity: Not on file    Other Topics      Concerns:        Left Handed: Not Asked        Right Handed: Yes        Currently spends a great deal of time in the sun: Not Asked        Past Sunlamp Treatments for Acne: Not Asked        His

## 2020-02-03 NOTE — PROCEDURES
Laceration noted to dorsum of left hand- 8 sutures in place; no erythema or discharge noted. Sutures removed easily-wound is not well approximated-steri strips applied. Triple anbx ointment applied and wound dressed. Aftercare instructions given to pt.  A

## 2020-02-03 NOTE — ASSESSMENT & PLAN NOTE
8 ext sutures removed  Steri strips applied   Refer hand specialist due to numbness on hand and finger and pain in wrist.

## 2020-02-03 NOTE — H&P
Injury 1: Laceration Dorsal Left Hand  - Date: 01/25/20  - Days Since: 30 Sandra Mann is a 38year old male that presents with Patient presents with:  Laceration: Dorsal Left Hand  .     REFERRED BY:  Sana Royal      Pacemaker: No  Latex Al Amoxicillin-Pot Clavulanate 875-125 MG Oral Tab Take 1 tablet by mouth 2 (two) times daily for 10 days. 20 tablet 0       Allergies:   No Known Allergies    No past medical history on file.   Past Surgical History:   Procedure Laterality Date   • HERNIA JENNIFER sunburns in the past: Not Asked        Tanning Salons in the Past: Not Asked        Hx of Radiation Treatments: Not Asked        Regular use of sun block: Not Asked    Social History Narrative      Not on file    No family history on file.     PHYSICAL EXAM

## 2020-02-05 ENCOUNTER — TELEPHONE (OUTPATIENT)
Dept: SURGERY | Facility: CLINIC | Age: 45
End: 2020-02-05

## 2020-02-05 ENCOUNTER — OFFICE VISIT (OUTPATIENT)
Dept: SURGERY | Facility: CLINIC | Age: 45
End: 2020-02-05
Payer: COMMERCIAL

## 2020-02-05 DIAGNOSIS — M62.81 DISTAL MUSCLE WEAKNESS: ICD-10-CM

## 2020-02-05 DIAGNOSIS — M25.642 STIFFNESS OF JOINT, HAND, LEFT: Primary | ICD-10-CM

## 2020-02-05 PROCEDURE — 97760 ORTHOTIC MGMT&TRAING 1ST ENC: CPT | Performed by: OCCUPATIONAL THERAPIST

## 2020-02-05 NOTE — PROGRESS NOTES
Subjective:  My splint is too tight   .       Objective:     Current level of performance:  ADL: Independent  Work: N/A  Leisure: N/A    Measurements/Tests:  ROM:         N/A         Treatment Provided this day: Adjusted left resting hand splint, re-wrapped

## 2020-02-10 ENCOUNTER — OFFICE VISIT (OUTPATIENT)
Dept: SURGERY | Facility: CLINIC | Age: 45
End: 2020-02-10
Payer: COMMERCIAL

## 2020-02-10 ENCOUNTER — APPOINTMENT (OUTPATIENT)
Dept: SURGERY | Facility: CLINIC | Age: 45
End: 2020-02-10
Payer: COMMERCIAL

## 2020-02-10 DIAGNOSIS — S61.412A LACERATION OF FINGER OF LEFT HAND WITH COMPLICATION, INITIAL ENCOUNTER: Primary | ICD-10-CM

## 2020-02-10 DIAGNOSIS — M62.81 DISTAL MUSCLE WEAKNESS: ICD-10-CM

## 2020-02-10 DIAGNOSIS — M25.642 STIFFNESS OF JOINT, HAND, LEFT: Primary | ICD-10-CM

## 2020-02-10 PROCEDURE — 97166 OT EVAL MOD COMPLEX 45 MIN: CPT | Performed by: OCCUPATIONAL THERAPIST

## 2020-02-10 PROCEDURE — 97110 THERAPEUTIC EXERCISES: CPT | Performed by: OCCUPATIONAL THERAPIST

## 2020-02-10 PROCEDURE — 99212 OFFICE O/P EST SF 10 MIN: CPT | Performed by: PLASTIC SURGERY

## 2020-02-10 NOTE — PROGRESS NOTES
Injury 1: Laceration Dorsal Left Hand with DSU branch laceration  - Date: 01/25/20  - Days Since: 16    Pt denies pain or s/s of infection  He denies numbness or tingling  Pt has kept resting hand splint on at all times as directed  Had dressing adjustment

## 2020-02-12 NOTE — TELEPHONE ENCOUNTER
Dr. Carrie Berumen,    Please sign off on form:  -Highlight the patient and hit \"Chart\" button. -In Chart Review, w/in the Encounter tab - click 1 time on the Telephone call encounter for 2/5/20.  Scroll down the telephone encounter.  -Click \"scan on\" blue DISPLAY PLAN FREE TEXT DISPLAY PLAN FREE TEXT

## 2020-02-13 ENCOUNTER — OFFICE VISIT (OUTPATIENT)
Dept: SURGERY | Facility: CLINIC | Age: 45
End: 2020-02-13
Payer: COMMERCIAL

## 2020-02-13 DIAGNOSIS — M25.642 STIFFNESS OF JOINT, HAND, LEFT: Primary | ICD-10-CM

## 2020-02-13 DIAGNOSIS — M62.81 DISTAL MUSCLE WEAKNESS: ICD-10-CM

## 2020-02-13 PROCEDURE — 97166 OT EVAL MOD COMPLEX 45 MIN: CPT | Performed by: OCCUPATIONAL THERAPIST

## 2020-02-13 PROCEDURE — 97110 THERAPEUTIC EXERCISES: CPT | Performed by: OCCUPATIONAL THERAPIST

## 2020-02-13 PROCEDURE — 97022 WHIRLPOOL THERAPY: CPT | Performed by: OCCUPATIONAL THERAPIST

## 2020-02-13 NOTE — PROGRESS NOTES
OCCUPATIONAL THERAPY EVALUATION:   Antonia Durbin   [id number]       SUBJECTIVE:    HX of Injury: Left hand dorsal surface laceration from a piece of glass. Chief Complaint:   Left hand and wrist tightness. .    Precautions: No work involving the Left Activities  Modalities  Scar Management  Patient/Family Education  Splinting: Left resting hand splint. GOALS:  Short term goals to be reached in x 1 week: Independent with HEP. Jason Margaret Jason Margaret 2) Increased MP AROM x 15 - 20 degrees .     Increased PIP AROM  X 1

## 2020-02-14 NOTE — TELEPHONE ENCOUNTER
Emailed completed FMLA to pt's HR: [email or web address]. Pt picking up at [facility]. [employer] PSR notified pt.

## 2020-02-17 ENCOUNTER — OFFICE VISIT (OUTPATIENT)
Dept: SURGERY | Facility: CLINIC | Age: 45
End: 2020-02-17
Payer: COMMERCIAL

## 2020-02-17 DIAGNOSIS — M25.642 STIFFNESS OF JOINT, HAND, LEFT: Primary | ICD-10-CM

## 2020-02-17 DIAGNOSIS — S61.412A LACERATION OF FINGER OF LEFT HAND WITH COMPLICATION, INITIAL ENCOUNTER: Primary | ICD-10-CM

## 2020-02-17 DIAGNOSIS — M62.81 DISTAL MUSCLE WEAKNESS: ICD-10-CM

## 2020-02-17 PROCEDURE — 97110 THERAPEUTIC EXERCISES: CPT | Performed by: OCCUPATIONAL THERAPIST

## 2020-02-17 PROCEDURE — 99212 OFFICE O/P EST SF 10 MIN: CPT | Performed by: PLASTIC SURGERY

## 2020-02-17 NOTE — PROGRESS NOTES
Subjective: I am ready to return to work. Objective:     Current level of performance:  ADL: Independent  Work: Returning to full duty.   Leisure: Family    Measurements/Tests:  ROM:  Testing By: nicko   Strength Right: 60 #      Strength Left: 4

## 2020-02-17 NOTE — PROGRESS NOTES
Injury 1: Laceration Dorsal Left Hand with DSU branch laceration  - Date: 01/25/20  - Days Since: 21   Pt Khmer speaking  used. \"Doing better\"  Intermittent dull pain. Rates pain 5/10. Not taking analgesics. Denies numbness and tingling.
Dr. Nelson

## 2020-10-13 ENCOUNTER — TELEPHONE (OUTPATIENT)
Dept: FAMILY MEDICINE CLINIC | Facility: CLINIC | Age: 45
End: 2020-10-13

## 2020-10-13 NOTE — TELEPHONE ENCOUNTER
Called patient to reschedule physical due to doctor not being available. Spoke with wife and requesting to know if patient is able to get physical for a sooner date, states per insurance  he needs physical before the end of the year. Are we gal to accommodate for a sooner date?

## 2020-11-11 ENCOUNTER — OFFICE VISIT (OUTPATIENT)
Dept: FAMILY MEDICINE CLINIC | Facility: CLINIC | Age: 45
End: 2020-11-11
Payer: COMMERCIAL

## 2020-11-11 VITALS
DIASTOLIC BLOOD PRESSURE: 60 MMHG | WEIGHT: 201 LBS | SYSTOLIC BLOOD PRESSURE: 130 MMHG | BODY MASS INDEX: 33.49 KG/M2 | TEMPERATURE: 97 F | HEIGHT: 65 IN | HEART RATE: 55 BPM

## 2020-11-11 DIAGNOSIS — Z00.00 ROUTINE MEDICAL EXAM: Primary | ICD-10-CM

## 2020-11-11 DIAGNOSIS — G89.29 CHRONIC PAIN OF BOTH KNEES: ICD-10-CM

## 2020-11-11 DIAGNOSIS — M25.561 CHRONIC PAIN OF BOTH KNEES: ICD-10-CM

## 2020-11-11 DIAGNOSIS — G47.00 INSOMNIA, UNSPECIFIED TYPE: ICD-10-CM

## 2020-11-11 DIAGNOSIS — M25.562 CHRONIC PAIN OF BOTH KNEES: ICD-10-CM

## 2020-11-11 PROCEDURE — 99072 ADDL SUPL MATRL&STAF TM PHE: CPT | Performed by: FAMILY MEDICINE

## 2020-11-11 PROCEDURE — 3078F DIAST BP <80 MM HG: CPT | Performed by: FAMILY MEDICINE

## 2020-11-11 PROCEDURE — 3008F BODY MASS INDEX DOCD: CPT | Performed by: FAMILY MEDICINE

## 2020-11-11 PROCEDURE — 99396 PREV VISIT EST AGE 40-64: CPT | Performed by: FAMILY MEDICINE

## 2020-11-11 PROCEDURE — 3075F SYST BP GE 130 - 139MM HG: CPT | Performed by: FAMILY MEDICINE

## 2020-11-11 RX ORDER — NAPROXEN 500 MG/1
500 TABLET ORAL 2 TIMES DAILY WITH MEALS
Qty: 60 TABLET | Refills: 0 | Status: SHIPPED | OUTPATIENT
Start: 2020-11-11

## 2020-11-11 RX ORDER — CYCLOBENZAPRINE HCL 5 MG
5 TABLET ORAL NIGHTLY
Qty: 30 TABLET | Refills: 1 | Status: SHIPPED | OUTPATIENT
Start: 2020-11-11

## 2020-11-12 NOTE — PROGRESS NOTES
Suemet Engel is a 41year old male who presents for a complete physical exam.   HPI:   Pt here for regular physical. Gaining weight. Reports pain in both knees and hands. Reports almost daily pain.  Reports job can be physical - mostly standing bu auscultation  CARDIO: RRR without murmur  GI: good BS's,no masses, HSM or tenderness  EXTREMITIES: no cyanosis, clubbing or edema  NEURO: Oriented times three,cranial nerves are intact,motor and sensory are grossly intact    ASSESSMENT AND PLAN:   Gómez Adkins

## 2020-11-30 ENCOUNTER — TELEPHONE (OUTPATIENT)
Dept: FAMILY MEDICINE CLINIC | Facility: CLINIC | Age: 45
End: 2020-11-30

## 2020-12-01 NOTE — TELEPHONE ENCOUNTER
Patient came in to office. Brought in annual preventive exam form to by signed. Physical was on 11/11/20. Form signed by Dr. Wayne Samuel.
Patient in [employer] requesting proof that he had PX done for work.   Please advs
complains of pain/discomfort

## 2020-12-02 NOTE — TELEPHONE ENCOUNTER
Pt brought FMLA Forms to  [facility] for Dr María Laws to complete. Pt signed HIPPA and pd $25 fee. Pt stressed he needs asap and was informed it usually takes 10-14 business days. Scanned to [facility] and brought originals to [facility] @ [facility]. [Follow-Up: _____] : a [unfilled] follow-up visit

## 2020-12-05 ENCOUNTER — LAB ENCOUNTER (OUTPATIENT)
Dept: LAB | Age: 45
End: 2020-12-05
Attending: FAMILY MEDICINE
Payer: COMMERCIAL

## 2020-12-05 DIAGNOSIS — G89.29 CHRONIC PAIN OF BOTH KNEES: ICD-10-CM

## 2020-12-05 DIAGNOSIS — Z00.00 ROUTINE MEDICAL EXAM: ICD-10-CM

## 2020-12-05 DIAGNOSIS — M25.561 CHRONIC PAIN OF BOTH KNEES: ICD-10-CM

## 2020-12-05 DIAGNOSIS — M25.562 CHRONIC PAIN OF BOTH KNEES: ICD-10-CM

## 2020-12-05 PROCEDURE — 83036 HEMOGLOBIN GLYCOSYLATED A1C: CPT

## 2020-12-05 PROCEDURE — 84443 ASSAY THYROID STIM HORMONE: CPT

## 2020-12-05 PROCEDURE — 80053 COMPREHEN METABOLIC PANEL: CPT

## 2020-12-05 PROCEDURE — 85025 COMPLETE CBC W/AUTO DIFF WBC: CPT

## 2020-12-05 PROCEDURE — 86431 RHEUMATOID FACTOR QUANT: CPT

## 2020-12-05 PROCEDURE — 36415 COLL VENOUS BLD VENIPUNCTURE: CPT

## 2020-12-05 PROCEDURE — 80061 LIPID PANEL: CPT

## 2020-12-08 NOTE — PROGRESS NOTES
Pt is in the pre-diabetes range - does not have it yet but needs to make dietary changes for weight loss and start regular exercise. Will monitor yearly. Rest of the labs were good.

## 2020-12-09 NOTE — PROGRESS NOTES
OCCUPATIONAL THERAPY EVALUATION:   Felisha Faulkner   [id number]       SUBJECTIVE:    HX of Injury: Left hand dorsal surface laceration from a piece of glass. Chief Complaint:   Left hand and wrist tightness. .    Precautions: No work involving the left Deann Bailon MD [Normal] : orientated to person, place, and time [de-identified] : reg, + murmur [de-identified] : trace edema, + rt foot wound [General Appearance - Well Developed] : well developed [Normal Appearance] : normal appearance [Well Groomed] : well groomed [General Appearance - Well Nourished] : well nourished [No Deformities] : no deformities [General Appearance - In No Acute Distress] : no acute distress [Normal Conjunctiva] : the conjunctiva exhibited no abnormalities [Eyelids - No Xanthelasma] : the eyelids demonstrated no xanthelasmas [Normal Oral Mucosa] : normal oral mucosa [No Oral Pallor] : no oral pallor [No Oral Cyanosis] : no oral cyanosis [Normal Jugular Venous A Waves Present] : normal jugular venous A waves present [Normal Jugular Venous V Waves Present] : normal jugular venous V waves present [No Jugular Venous Celis A Waves] : no jugular venous celis A waves [Respiration, Rhythm And Depth] : normal respiratory rhythm and effort [Exaggerated Use Of Accessory Muscles For Inspiration] : no accessory muscle use [Auscultation Breath Sounds / Voice Sounds] : lungs were clear to auscultation bilaterally [Heart Rate And Rhythm] : heart rate and rhythm were normal [Heart Sounds] : normal S1 and S2 [Murmurs] : no murmurs present [Abdomen Soft] : soft [Abdomen Tenderness] : non-tender [Abdomen Mass (___ Cm)] : no abdominal mass palpated [Abnormal Walk] : normal gait [Gait - Sufficient For Exercise Testing] : the gait was sufficient for exercise testing [Nail Clubbing] : no clubbing of the fingernails [Cyanosis, Localized] : no localized cyanosis [Petechial Hemorrhages (___cm)] : no petechial hemorrhages [Skin Color & Pigmentation] : normal skin color and pigmentation [] : no rash [No Venous Stasis] : no venous stasis [No Xanthoma] : no  xanthoma was observed [FreeTextEntry1] : Rt shin  large wound from trip and fall, Rt foot ulcer on bottom, healing [Oriented To Time, Place, And Person] : oriented to person, place, and time [Affect] : the affect was normal [Mood] : the mood was normal [No Anxiety] : not feeling anxious

## 2021-03-08 ENCOUNTER — TELEPHONE (OUTPATIENT)
Dept: FAMILY MEDICINE CLINIC | Facility: CLINIC | Age: 46
End: 2021-03-08

## 2021-03-08 DIAGNOSIS — Z11.52 ENCOUNTER FOR SCREENING FOR COVID-19: Primary | ICD-10-CM

## 2021-03-08 NOTE — TELEPHONE ENCOUNTER
Pt is requesting order for Covid testing in order for him to be able to travel out of the country. Please advise.

## 2021-03-09 NOTE — TELEPHONE ENCOUNTER
Called patient verified full name and . Informed patient that test was order, provided ph# to central scheduling.

## 2021-10-26 ENCOUNTER — OFFICE VISIT (OUTPATIENT)
Dept: FAMILY MEDICINE CLINIC | Facility: CLINIC | Age: 46
End: 2021-10-26
Payer: COMMERCIAL

## 2021-10-26 VITALS
TEMPERATURE: 97 F | WEIGHT: 194.19 LBS | DIASTOLIC BLOOD PRESSURE: 63 MMHG | HEART RATE: 59 BPM | SYSTOLIC BLOOD PRESSURE: 110 MMHG | BODY MASS INDEX: 32.35 KG/M2 | HEIGHT: 65 IN

## 2021-10-26 DIAGNOSIS — J01.00 ACUTE MAXILLARY SINUSITIS, RECURRENCE NOT SPECIFIED: ICD-10-CM

## 2021-10-26 DIAGNOSIS — J02.9 SORE THROAT: ICD-10-CM

## 2021-10-26 DIAGNOSIS — G44.209 TENSION HEADACHE: Primary | ICD-10-CM

## 2021-10-26 PROCEDURE — 3078F DIAST BP <80 MM HG: CPT | Performed by: FAMILY MEDICINE

## 2021-10-26 PROCEDURE — 3008F BODY MASS INDEX DOCD: CPT | Performed by: FAMILY MEDICINE

## 2021-10-26 PROCEDURE — 3074F SYST BP LT 130 MM HG: CPT | Performed by: FAMILY MEDICINE

## 2021-10-26 PROCEDURE — 99213 OFFICE O/P EST LOW 20 MIN: CPT | Performed by: FAMILY MEDICINE

## 2021-10-26 PROCEDURE — 87880 STREP A ASSAY W/OPTIC: CPT | Performed by: FAMILY MEDICINE

## 2021-10-26 RX ORDER — FLUTICASONE PROPIONATE 50 MCG
2 SPRAY, SUSPENSION (ML) NASAL DAILY
Qty: 18 ML | Refills: 0 | Status: SHIPPED | OUTPATIENT
Start: 2021-10-26 | End: 2022-10-21

## 2021-10-26 RX ORDER — LEVOCETIRIZINE DIHYDROCHLORIDE 5 MG/1
5 TABLET, FILM COATED ORAL EVERY EVENING
Qty: 30 TABLET | Refills: 0 | Status: SHIPPED | OUTPATIENT
Start: 2021-10-26

## 2021-10-26 RX ORDER — AMOXICILLIN AND CLAVULANATE POTASSIUM 875; 125 MG/1; MG/1
1 TABLET, FILM COATED ORAL 2 TIMES DAILY
Qty: 20 TABLET | Refills: 0 | Status: SHIPPED | OUTPATIENT
Start: 2021-10-26 | End: 2021-11-05

## 2021-10-26 NOTE — PROGRESS NOTES
Ashli Leslie is a 40year old male. Patient presents with:  Headache: chronic x 5-6 months  Sore Throat: x 2 weeks    HPI:   Reports about 6 months - pain at back of head and at forehead and dizziness at times.  Takes medications and improves - tylen edema      ASSESSMENT AND PLAN:   1. Tension headache      2. Sore throat    - STREP A ASSAY W/OPTIC    3. Acute maxillary sinusitis, recurrence not specified  tx with augmentin BID x 10 days, xyzal in evenings and flonase. Increase fluids.            The p

## 2022-02-09 ENCOUNTER — OFFICE VISIT (OUTPATIENT)
Dept: FAMILY MEDICINE CLINIC | Facility: CLINIC | Age: 47
End: 2022-02-09
Payer: COMMERCIAL

## 2022-02-09 VITALS
WEIGHT: 200.38 LBS | BODY MASS INDEX: 33.38 KG/M2 | DIASTOLIC BLOOD PRESSURE: 73 MMHG | HEART RATE: 59 BPM | TEMPERATURE: 98 F | HEIGHT: 65 IN | SYSTOLIC BLOOD PRESSURE: 132 MMHG

## 2022-02-09 DIAGNOSIS — Z00.00 ROUTINE MEDICAL EXAM: Primary | ICD-10-CM

## 2022-02-09 DIAGNOSIS — G44.209 TENSION HEADACHE: ICD-10-CM

## 2022-02-09 DIAGNOSIS — J02.9 SORE THROAT: ICD-10-CM

## 2022-02-09 DIAGNOSIS — G47.00 INSOMNIA, UNSPECIFIED TYPE: ICD-10-CM

## 2022-02-09 DIAGNOSIS — R73.03 PREDIABETES: ICD-10-CM

## 2022-02-09 DIAGNOSIS — M25.561 CHRONIC PAIN OF BOTH KNEES: ICD-10-CM

## 2022-02-09 DIAGNOSIS — M25.562 CHRONIC PAIN OF BOTH KNEES: ICD-10-CM

## 2022-02-09 DIAGNOSIS — E55.9 VITAMIN D DEFICIENCY: ICD-10-CM

## 2022-02-09 DIAGNOSIS — G89.29 CHRONIC PAIN OF BOTH KNEES: ICD-10-CM

## 2022-02-09 DIAGNOSIS — Z12.11 SCREEN FOR COLON CANCER: ICD-10-CM

## 2022-02-09 PROCEDURE — 3008F BODY MASS INDEX DOCD: CPT | Performed by: FAMILY MEDICINE

## 2022-02-09 PROCEDURE — 99396 PREV VISIT EST AGE 40-64: CPT | Performed by: FAMILY MEDICINE

## 2022-02-09 PROCEDURE — 3078F DIAST BP <80 MM HG: CPT | Performed by: FAMILY MEDICINE

## 2022-02-09 PROCEDURE — 3075F SYST BP GE 130 - 139MM HG: CPT | Performed by: FAMILY MEDICINE

## 2022-02-09 PROCEDURE — 99213 OFFICE O/P EST LOW 20 MIN: CPT | Performed by: FAMILY MEDICINE

## 2022-02-09 RX ORDER — OMEPRAZOLE 20 MG/1
20 CAPSULE, DELAYED RELEASE ORAL
Qty: 30 CAPSULE | Refills: 2 | Status: SHIPPED | OUTPATIENT
Start: 2022-02-09 | End: 2023-02-09

## 2022-02-09 RX ORDER — AMITRIPTYLINE HYDROCHLORIDE 10 MG/1
10 TABLET, FILM COATED ORAL NIGHTLY
Qty: 30 TABLET | Refills: 1 | Status: SHIPPED | OUTPATIENT
Start: 2022-02-09

## 2022-02-12 ENCOUNTER — OFFICE VISIT (OUTPATIENT)
Dept: OTOLARYNGOLOGY | Facility: CLINIC | Age: 47
End: 2022-02-12
Payer: COMMERCIAL

## 2022-02-12 ENCOUNTER — LAB ENCOUNTER (OUTPATIENT)
Dept: LAB | Facility: HOSPITAL | Age: 47
End: 2022-02-12
Attending: FAMILY MEDICINE
Payer: COMMERCIAL

## 2022-02-12 DIAGNOSIS — E55.9 VITAMIN D DEFICIENCY: ICD-10-CM

## 2022-02-12 DIAGNOSIS — H92.02 LEFT EAR PAIN: ICD-10-CM

## 2022-02-12 DIAGNOSIS — R73.03 PREDIABETES: ICD-10-CM

## 2022-02-12 DIAGNOSIS — R07.0 THROAT PAIN: Primary | ICD-10-CM

## 2022-02-12 DIAGNOSIS — Z00.00 ROUTINE MEDICAL EXAM: ICD-10-CM

## 2022-02-12 LAB
ALBUMIN SERPL-MCNC: 4 G/DL (ref 3.4–5)
ALBUMIN/GLOB SERPL: 1.4 {RATIO} (ref 1–2)
ALP LIVER SERPL-CCNC: 98 U/L
ALT SERPL-CCNC: 34 U/L
ANION GAP SERPL CALC-SCNC: 6 MMOL/L (ref 0–18)
AST SERPL-CCNC: 15 U/L (ref 15–37)
BASOPHILS # BLD AUTO: 0.06 X10(3) UL (ref 0–0.2)
BASOPHILS NFR BLD AUTO: 1 %
BILIRUB SERPL-MCNC: 0.7 MG/DL (ref 0.1–2)
BUN BLD-MCNC: 16 MG/DL (ref 7–18)
BUN/CREAT SERPL: 17 (ref 10–20)
CALCIUM BLD-MCNC: 9.1 MG/DL (ref 8.5–10.1)
CHLORIDE SERPL-SCNC: 108 MMOL/L (ref 98–112)
CHOLEST SERPL-MCNC: 164 MG/DL (ref ?–200)
CO2 SERPL-SCNC: 29 MMOL/L (ref 21–32)
CREAT BLD-MCNC: 0.94 MG/DL
CREAT UR-SCNC: 111 MG/DL
DEPRECATED RDW RBC AUTO: 41.8 FL (ref 35.1–46.3)
EOSINOPHIL # BLD AUTO: 0.26 X10(3) UL (ref 0–0.7)
EOSINOPHIL NFR BLD AUTO: 4.3 %
ERYTHROCYTE [DISTWIDTH] IN BLOOD BY AUTOMATED COUNT: 12 % (ref 11–15)
EST. AVERAGE GLUCOSE BLD GHB EST-MCNC: 123 MG/DL (ref 68–126)
FASTING PATIENT LIPID ANSWER: YES
FASTING STATUS PATIENT QL REPORTED: YES
GLOBULIN PLAS-MCNC: 2.8 G/DL (ref 2.8–4.4)
GLUCOSE BLD-MCNC: 96 MG/DL (ref 70–99)
HBA1C MFR BLD: 5.9 % (ref ?–5.7)
HCT VFR BLD AUTO: 46.3 %
HDLC SERPL-MCNC: 40 MG/DL (ref 40–59)
HGB BLD-MCNC: 15.2 G/DL
IMM GRANULOCYTES # BLD AUTO: 0.03 X10(3) UL (ref 0–1)
IMM GRANULOCYTES NFR BLD: 0.5 %
LDLC SERPL CALC-MCNC: 101 MG/DL (ref ?–100)
LYMPHOCYTES # BLD AUTO: 1.87 X10(3) UL (ref 1–4)
LYMPHOCYTES NFR BLD AUTO: 31.2 %
MCH RBC QN AUTO: 31.1 PG (ref 26–34)
MCHC RBC AUTO-ENTMCNC: 32.8 G/DL (ref 31–37)
MCV RBC AUTO: 94.7 FL
MICROALBUMIN UR-MCNC: 0.52 MG/DL
MICROALBUMIN/CREAT 24H UR-RTO: 4.7 UG/MG (ref ?–30)
MONOCYTES # BLD AUTO: 0.43 X10(3) UL (ref 0.1–1)
MONOCYTES NFR BLD AUTO: 7.2 %
NEUTROPHILS # BLD AUTO: 3.34 X10 (3) UL (ref 1.5–7.7)
NEUTROPHILS # BLD AUTO: 3.34 X10(3) UL (ref 1.5–7.7)
NEUTROPHILS NFR BLD AUTO: 55.8 %
OSMOLALITY SERPL CALC.SUM OF ELEC: 297 MOSM/KG (ref 275–295)
PLATELET # BLD AUTO: 251 10(3)UL (ref 150–450)
POTASSIUM SERPL-SCNC: 4.2 MMOL/L (ref 3.5–5.1)
PROT SERPL-MCNC: 6.8 G/DL (ref 6.4–8.2)
RBC # BLD AUTO: 4.89 X10(6)UL
SODIUM SERPL-SCNC: 143 MMOL/L (ref 136–145)
TRIGL SERPL-MCNC: 125 MG/DL (ref 30–149)
TSI SER-ACNC: 1.8 MIU/ML (ref 0.36–3.74)
VIT B12 SERPL-MCNC: >2000 PG/ML (ref 193–986)
VIT D+METAB SERPL-MCNC: 14.3 NG/ML (ref 30–100)
VLDLC SERPL CALC-MCNC: 21 MG/DL (ref 0–30)
WBC # BLD AUTO: 6 X10(3) UL (ref 4–11)

## 2022-02-12 PROCEDURE — 82306 VITAMIN D 25 HYDROXY: CPT

## 2022-02-12 PROCEDURE — 85025 COMPLETE CBC W/AUTO DIFF WBC: CPT

## 2022-02-12 PROCEDURE — 31575 DIAGNOSTIC LARYNGOSCOPY: CPT | Performed by: OTOLARYNGOLOGY

## 2022-02-12 PROCEDURE — 83036 HEMOGLOBIN GLYCOSYLATED A1C: CPT

## 2022-02-12 PROCEDURE — 82570 ASSAY OF URINE CREATININE: CPT

## 2022-02-12 PROCEDURE — 80061 LIPID PANEL: CPT

## 2022-02-12 PROCEDURE — 99243 OFF/OP CNSLTJ NEW/EST LOW 30: CPT | Performed by: OTOLARYNGOLOGY

## 2022-02-12 PROCEDURE — 84443 ASSAY THYROID STIM HORMONE: CPT

## 2022-02-12 PROCEDURE — 36415 COLL VENOUS BLD VENIPUNCTURE: CPT

## 2022-02-12 PROCEDURE — 82043 UR ALBUMIN QUANTITATIVE: CPT

## 2022-02-12 PROCEDURE — 80053 COMPREHEN METABOLIC PANEL: CPT

## 2022-02-12 PROCEDURE — 82607 VITAMIN B-12: CPT

## 2022-02-12 RX ORDER — AZELASTINE 1 MG/ML
2 SPRAY, METERED NASAL 2 TIMES DAILY
Qty: 30 ML | Refills: 0 | Status: SHIPPED | OUTPATIENT
Start: 2022-02-12

## 2022-02-12 RX ORDER — CELECOXIB 200 MG/1
200 CAPSULE ORAL DAILY PRN
Qty: 30 CAPSULE | Refills: 0 | Status: SHIPPED | OUTPATIENT
Start: 2022-02-12 | End: 2022-03-14

## 2022-02-12 RX ORDER — MONTELUKAST SODIUM 10 MG/1
10 TABLET ORAL NIGHTLY
Qty: 30 TABLET | Refills: 3 | Status: SHIPPED | OUTPATIENT
Start: 2022-02-12

## 2022-02-12 RX ORDER — OMEPRAZOLE 40 MG/1
40 CAPSULE, DELAYED RELEASE ORAL DAILY
Qty: 30 CAPSULE | Refills: 2 | Status: SHIPPED | OUTPATIENT
Start: 2022-02-12

## 2022-02-12 RX ORDER — LORATADINE 10 MG/1
10 TABLET ORAL DAILY
Qty: 30 TABLET | Refills: 3 | Status: SHIPPED | OUTPATIENT
Start: 2022-02-12

## 2022-02-12 RX ORDER — CYCLOBENZAPRINE HCL 5 MG
5 TABLET ORAL NIGHTLY
Qty: 30 TABLET | Refills: 1 | Status: SHIPPED | OUTPATIENT
Start: 2022-02-12

## 2022-02-15 RX ORDER — ERGOCALCIFEROL 1.25 MG/1
50000 CAPSULE ORAL WEEKLY
Qty: 12 CAPSULE | Refills: 4 | Status: SHIPPED | OUTPATIENT
Start: 2022-02-15 | End: 2022-03-17

## 2022-02-15 NOTE — PROGRESS NOTES
Labs are all stable just very low vitamin D levels. Will send weekly high dose vitamin D 50,000 units. Pt is on border of pre-diabetes but not diabetic. Needs to be better with his diet - smaller portions, no fast foods, less carbs and more exercise.

## 2022-03-10 ENCOUNTER — OFFICE VISIT (OUTPATIENT)
Dept: OTOLARYNGOLOGY | Facility: CLINIC | Age: 47
End: 2022-03-10
Payer: COMMERCIAL

## 2022-03-10 VITALS — TEMPERATURE: 98 F

## 2022-03-10 DIAGNOSIS — R07.0 THROAT PAIN: Primary | ICD-10-CM

## 2022-03-10 DIAGNOSIS — H92.02 LEFT EAR PAIN: ICD-10-CM

## 2022-03-10 PROCEDURE — 31575 DIAGNOSTIC LARYNGOSCOPY: CPT | Performed by: OTOLARYNGOLOGY

## 2022-03-10 PROCEDURE — 99213 OFFICE O/P EST LOW 20 MIN: CPT | Performed by: OTOLARYNGOLOGY

## 2022-03-10 RX ORDER — AZELASTINE 1 MG/ML
2 SPRAY, METERED NASAL 2 TIMES DAILY
Qty: 30 ML | Refills: 3 | Status: SHIPPED | OUTPATIENT
Start: 2022-03-10

## 2022-03-10 RX ORDER — METHYLPREDNISOLONE 4 MG/1
TABLET ORAL
Qty: 21 TABLET | Refills: 0 | Status: SHIPPED | OUTPATIENT
Start: 2022-03-10

## 2022-03-10 RX ORDER — PSEUDOEPHEDRINE HCL 120 MG/1
120 TABLET, FILM COATED, EXTENDED RELEASE ORAL EVERY 12 HOURS
Qty: 60 TABLET | Refills: 3 | Status: SHIPPED | OUTPATIENT
Start: 2022-03-10

## 2023-05-24 ENCOUNTER — HOSPITAL ENCOUNTER (EMERGENCY)
Facility: HOSPITAL | Age: 48
Discharge: HOME | End: 2023-05-25
Attending: EMERGENCY MEDICINE
Payer: COMMERCIAL

## 2023-05-24 DIAGNOSIS — H10.32 ACUTE CONJUNCTIVITIS OF LEFT EYE, UNSPECIFIED ACUTE CONJUNCTIVITIS TYPE: Primary | ICD-10-CM

## 2023-05-24 PROCEDURE — 99283 EMERGENCY DEPT VISIT LOW MDM: CPT

## 2023-05-24 RX ORDER — TETRACAINE HYDROCHLORIDE 5 MG/ML
1 SOLUTION OPHTHALMIC ONCE
Status: COMPLETED | OUTPATIENT
Start: 2023-05-24 | End: 2023-05-25

## 2023-05-25 VITALS
SYSTOLIC BLOOD PRESSURE: 129 MMHG | HEART RATE: 61 BPM | WEIGHT: 185 LBS | DIASTOLIC BLOOD PRESSURE: 90 MMHG | HEIGHT: 65 IN | TEMPERATURE: 97 F | OXYGEN SATURATION: 95 % | RESPIRATION RATE: 16 BRPM | BODY MASS INDEX: 30.82 KG/M2

## 2023-05-25 RX ORDER — CIPROFLOXACIN HYDROCHLORIDE 3.5 MG/ML
1 SOLUTION/ DROPS TOPICAL ONCE
Status: COMPLETED | OUTPATIENT
Start: 2023-05-25 | End: 2023-05-25

## 2023-05-25 RX ORDER — IBUPROFEN 600 MG/1
600 TABLET ORAL ONCE
Status: COMPLETED | OUTPATIENT
Start: 2023-05-25 | End: 2023-05-25

## 2023-05-25 RX ORDER — CIPROFLOXACIN HYDROCHLORIDE 3.5 MG/ML
2 SOLUTION/ DROPS TOPICAL
Qty: 5 ML | Refills: 0 | Status: SHIPPED | OUTPATIENT
Start: 2023-05-25 | End: 2023-05-30

## 2023-05-25 NOTE — CM/SW NOTE
EDCM WAS ASKED TO GET APPOINTMENT FOR THIS PATIENT WITH DR Tena Rai OPHTHALMOLOGY    EDCM WILL CALL AND MAKE APPOINTMENT AND THEN FOLLOW UP WITH PATIENT ON DATE AND TIME. Olu Mejia MSN, MARIA DEL CARMEN, RN  Emergency Department   Extension [phone]  .

## 2023-05-25 NOTE — ED INITIAL ASSESSMENT (HPI)
Pt c/o left eye pain x8 days. Pt reports pain, and pt reports \"I feel like there is something in my eye. I have blurry vision to that eye\".

## 2023-05-25 NOTE — CM/SW NOTE
Contacted Dr Kary Rouse office for follow up appt. They will call him to see if he can come in today.

## 2023-05-27 ENCOUNTER — OFFICE VISIT (OUTPATIENT)
Dept: FAMILY MEDICINE CLINIC | Facility: CLINIC | Age: 48
End: 2023-05-27

## 2023-05-27 ENCOUNTER — LAB ENCOUNTER (OUTPATIENT)
Dept: LAB | Facility: REFERENCE LAB | Age: 48
End: 2023-05-27
Attending: NURSE PRACTITIONER
Payer: COMMERCIAL

## 2023-05-27 VITALS
BODY MASS INDEX: 31.32 KG/M2 | DIASTOLIC BLOOD PRESSURE: 71 MMHG | WEIGHT: 188 LBS | HEIGHT: 65 IN | HEART RATE: 53 BPM | SYSTOLIC BLOOD PRESSURE: 121 MMHG

## 2023-05-27 DIAGNOSIS — E55.9 VITAMIN D DEFICIENCY: ICD-10-CM

## 2023-05-27 DIAGNOSIS — Z12.11 SCREENING FOR MALIGNANT NEOPLASM OF COLON: ICD-10-CM

## 2023-05-27 DIAGNOSIS — Z00.00 WELL ADULT EXAM: ICD-10-CM

## 2023-05-27 DIAGNOSIS — J30.2 SEASONAL ALLERGIC RHINITIS, UNSPECIFIED TRIGGER: ICD-10-CM

## 2023-05-27 DIAGNOSIS — R73.01 ELEVATED FASTING GLUCOSE: ICD-10-CM

## 2023-05-27 DIAGNOSIS — Z00.00 WELL ADULT EXAM: Primary | ICD-10-CM

## 2023-05-27 LAB
ALBUMIN SERPL-MCNC: 3.9 G/DL (ref 3.4–5)
ALBUMIN/GLOB SERPL: 1.2 {RATIO} (ref 1–2)
ALP LIVER SERPL-CCNC: 83 U/L
ALT SERPL-CCNC: 31 U/L
ANION GAP SERPL CALC-SCNC: 4 MMOL/L (ref 0–18)
AST SERPL-CCNC: 14 U/L (ref 15–37)
BASOPHILS # BLD AUTO: 0.08 X10(3) UL (ref 0–0.2)
BASOPHILS NFR BLD AUTO: 1 %
BILIRUB SERPL-MCNC: 0.4 MG/DL (ref 0.1–2)
BUN BLD-MCNC: 20 MG/DL (ref 7–18)
BUN/CREAT SERPL: 17.7 (ref 10–20)
CALCIUM BLD-MCNC: 9.3 MG/DL (ref 8.5–10.1)
CHLORIDE SERPL-SCNC: 109 MMOL/L (ref 98–112)
CHOLEST SERPL-MCNC: 161 MG/DL (ref ?–200)
CO2 SERPL-SCNC: 29 MMOL/L (ref 21–32)
CREAT BLD-MCNC: 1.13 MG/DL
DEPRECATED RDW RBC AUTO: 44 FL (ref 35.1–46.3)
EOSINOPHIL # BLD AUTO: 0.22 X10(3) UL (ref 0–0.7)
EOSINOPHIL NFR BLD AUTO: 2.8 %
ERYTHROCYTE [DISTWIDTH] IN BLOOD BY AUTOMATED COUNT: 12.4 % (ref 11–15)
FASTING PATIENT LIPID ANSWER: YES
FASTING STATUS PATIENT QL REPORTED: YES
GFR SERPLBLD BASED ON 1.73 SQ M-ARVRAT: 81 ML/MIN/1.73M2 (ref 60–?)
GLOBULIN PLAS-MCNC: 3.3 G/DL (ref 2.8–4.4)
GLUCOSE BLD-MCNC: 115 MG/DL (ref 70–99)
HCT VFR BLD AUTO: 49.9 %
HDLC SERPL-MCNC: 56 MG/DL (ref 40–59)
HGB BLD-MCNC: 16.2 G/DL
IMM GRANULOCYTES # BLD AUTO: 0.05 X10(3) UL (ref 0–1)
IMM GRANULOCYTES NFR BLD: 0.6 %
LDLC SERPL CALC-MCNC: 83 MG/DL (ref ?–100)
LYMPHOCYTES # BLD AUTO: 2.29 X10(3) UL (ref 1–4)
LYMPHOCYTES NFR BLD AUTO: 29.2 %
MCH RBC QN AUTO: 31.4 PG (ref 26–34)
MCHC RBC AUTO-ENTMCNC: 32.5 G/DL (ref 31–37)
MCV RBC AUTO: 96.7 FL
MONOCYTES # BLD AUTO: 0.56 X10(3) UL (ref 0.1–1)
MONOCYTES NFR BLD AUTO: 7.1 %
NEUTROPHILS # BLD AUTO: 4.64 X10 (3) UL (ref 1.5–7.7)
NEUTROPHILS # BLD AUTO: 4.64 X10(3) UL (ref 1.5–7.7)
NEUTROPHILS NFR BLD AUTO: 59.3 %
NONHDLC SERPL-MCNC: 105 MG/DL (ref ?–130)
OSMOLALITY SERPL CALC.SUM OF ELEC: 298 MOSM/KG (ref 275–295)
PLATELET # BLD AUTO: 267 10(3)UL (ref 150–450)
POTASSIUM SERPL-SCNC: 4.6 MMOL/L (ref 3.5–5.1)
PROT SERPL-MCNC: 7.2 G/DL (ref 6.4–8.2)
RBC # BLD AUTO: 5.16 X10(6)UL
SODIUM SERPL-SCNC: 142 MMOL/L (ref 136–145)
TRIGL SERPL-MCNC: 122 MG/DL (ref 30–149)
TSI SER-ACNC: 2.76 MIU/ML (ref 0.36–3.74)
VIT B12 SERPL-MCNC: 716 PG/ML (ref 193–986)
VIT D+METAB SERPL-MCNC: 21.1 NG/ML (ref 30–100)
VLDLC SERPL CALC-MCNC: 19 MG/DL (ref 0–30)
WBC # BLD AUTO: 7.8 X10(3) UL (ref 4–11)

## 2023-05-27 PROCEDURE — 83036 HEMOGLOBIN GLYCOSYLATED A1C: CPT

## 2023-05-27 PROCEDURE — 80061 LIPID PANEL: CPT

## 2023-05-27 PROCEDURE — 80053 COMPREHEN METABOLIC PANEL: CPT

## 2023-05-27 PROCEDURE — 82607 VITAMIN B-12: CPT

## 2023-05-27 PROCEDURE — 82306 VITAMIN D 25 HYDROXY: CPT

## 2023-05-27 PROCEDURE — 85025 COMPLETE CBC W/AUTO DIFF WBC: CPT

## 2023-05-27 PROCEDURE — 36415 COLL VENOUS BLD VENIPUNCTURE: CPT

## 2023-05-27 PROCEDURE — 84443 ASSAY THYROID STIM HORMONE: CPT

## 2023-05-29 DIAGNOSIS — R73.01 ELEVATED FASTING GLUCOSE: Primary | ICD-10-CM

## 2023-05-29 LAB
EST. AVERAGE GLUCOSE BLD GHB EST-MCNC: 117 MG/DL (ref 68–126)
HBA1C MFR BLD: 5.7 % (ref ?–5.7)

## 2023-11-29 ENCOUNTER — OFFICE VISIT (OUTPATIENT)
Facility: CLINIC | Age: 48
End: 2023-11-29

## 2023-11-29 ENCOUNTER — TELEPHONE (OUTPATIENT)
Facility: CLINIC | Age: 48
End: 2023-11-29

## 2023-11-29 DIAGNOSIS — Z12.11 COLON CANCER SCREENING: Primary | ICD-10-CM

## 2023-11-29 DIAGNOSIS — K64.9 HEMORRHOIDS, UNSPECIFIED HEMORRHOID TYPE: ICD-10-CM

## 2023-11-29 PROCEDURE — S0285 CNSLT BEFORE SCREEN COLONOSC: HCPCS | Performed by: STUDENT IN AN ORGANIZED HEALTH CARE EDUCATION/TRAINING PROGRAM

## 2023-11-29 RX ORDER — HYDROCORTISONE ACETATE 25 MG/1
25 SUPPOSITORY RECTAL 2 TIMES DAILY
Qty: 28 SUPPOSITORY | Refills: 0 | Status: SHIPPED | OUTPATIENT
Start: 2023-11-29 | End: 2023-12-04

## 2023-11-29 RX ORDER — SODIUM, POTASSIUM,MAG SULFATES 17.5-3.13G
SOLUTION, RECONSTITUTED, ORAL ORAL
Qty: 1 EACH | Refills: 0 | Status: SHIPPED | OUTPATIENT
Start: 2023-11-29 | End: 2023-12-04

## 2023-11-29 NOTE — PATIENT INSTRUCTIONS
*Use wet towelettes (Kleenix, Tucks, Dimitris) to clean the anal area after bowel movements and then pat dry the area with dry toilet paper. *DO NOT rub the area with dry toilet paper. Sitz baths can be taken as necessary (30 minutes soaking in a tub of tepid water once or twice a day for perianal burning and/or itching)    *Use anusol-HC suppositories twice daily for 14 days. (If not covered by insurance will have to use hydrocortisone cream twice per day for 14 days. *Start daily Metamucil and use one to two scoops per day. *Schedule colonoscopy with [facility] [Diagnosis: colon cancer screening]    * bowel prep from pharmacy (split dose suprep)    *Continue all medications for procedure    *Read all bowel prep instructions carefully    *AVOID seeds, nuts, popcorn, raw fruits and vegetables (cooked is okay) for 2-3 days before procedure    *If you start any NEW medication after your visit today, please notify us. Certain medications will need to be held before the procedure, or the procedure cannot be performed.

## 2023-11-29 NOTE — TELEPHONE ENCOUNTER
Scheduled for:  Colonoscopy [address]105  Provider Name:  Dr Perry  Date:  12/4/2023  Location:  [facility]  Sedation:  [facility]  Time:  0730 (pt is aware to arrive at 0630)  Prep:  Colyte  Meds/Allergies Reconciled?:  Physician reviewed  Diagnosis with codes:  CCS Z12.11  Was patient informed to call insurance with codes (Y/N):       Referral sent?:  Referral was sent at the time of electronic surgical scheduling. [facility] or [address] notified?:  I sent an electronic request to Endo Scheduling and received a confirmation today. Medication Orders:  Pt is aware to NOT take iron pills, herbal meds and diet supplements for 7 days before exam. Also to NOT take any form of alcohol, recreational drugs and any forms of ED meds 24 hours before exam.     Misc Orders:       Further instructions given by staff:  I discussed the prep intructions with the patient in office which HE verbally understood. Copy of instructions was handed to patient as well. Patient was also advised about cancellation policy.

## 2023-11-30 NOTE — PAT NURSING NOTE
Patient instructed to arrive at [facility] for his procedure, address provided. Verbalized understanding.  used throughout call.

## 2023-12-04 ENCOUNTER — HOSPITAL ENCOUNTER (OUTPATIENT)
Age: 48
Setting detail: HOSPITAL OUTPATIENT SURGERY
Discharge: HOME | End: 2023-12-04
Attending: STUDENT IN AN ORGANIZED HEALTH CARE EDUCATION/TRAINING PROGRAM | Admitting: STUDENT IN AN ORGANIZED HEALTH CARE EDUCATION/TRAINING PROGRAM
Payer: COMMERCIAL

## 2023-12-04 ENCOUNTER — ANESTHESIA EVENT (OUTPATIENT)
Dept: ENDOSCOPY | Age: 48
End: 2023-12-04
Payer: COMMERCIAL

## 2023-12-04 ENCOUNTER — ANESTHESIA (OUTPATIENT)
Dept: ENDOSCOPY | Age: 48
End: 2023-12-04
Payer: COMMERCIAL

## 2023-12-04 VITALS
BODY MASS INDEX: 33.32 KG/M2 | SYSTOLIC BLOOD PRESSURE: 130 MMHG | WEIGHT: 200 LBS | RESPIRATION RATE: 17 BRPM | OXYGEN SATURATION: 98 % | HEIGHT: 65 IN | DIASTOLIC BLOOD PRESSURE: 82 MMHG | HEART RATE: 44 BPM

## 2023-12-04 DIAGNOSIS — Z12.11 COLON CANCER SCREENING: ICD-10-CM

## 2023-12-04 PROCEDURE — 45385 COLONOSCOPY W/LESION REMOVAL: CPT | Performed by: STUDENT IN AN ORGANIZED HEALTH CARE EDUCATION/TRAINING PROGRAM

## 2023-12-04 PROCEDURE — 99070 SPECIAL SUPPLIES PHYS/QHP: CPT | Performed by: STUDENT IN AN ORGANIZED HEALTH CARE EDUCATION/TRAINING PROGRAM

## 2023-12-04 PROCEDURE — 45380 COLONOSCOPY AND BIOPSY: CPT | Performed by: STUDENT IN AN ORGANIZED HEALTH CARE EDUCATION/TRAINING PROGRAM

## 2023-12-04 RX ORDER — NALOXONE HYDROCHLORIDE 0.4 MG/ML
0.08 INJECTION, SOLUTION INTRAMUSCULAR; INTRAVENOUS; SUBCUTANEOUS ONCE AS NEEDED
OUTPATIENT
Start: 2023-12-04 | End: 2023-12-04

## 2023-12-04 RX ORDER — SODIUM CHLORIDE, SODIUM LACTATE, POTASSIUM CHLORIDE, CALCIUM CHLORIDE 600; 310; 30; 20 MG/100ML; MG/100ML; MG/100ML; MG/100ML
INJECTION, SOLUTION INTRAVENOUS CONTINUOUS
Status: DISCONTINUED | OUTPATIENT
Start: 2023-12-04 | End: 2023-12-04

## 2023-12-04 RX ORDER — SODIUM CHLORIDE, SODIUM LACTATE, POTASSIUM CHLORIDE, CALCIUM CHLORIDE 600; 310; 30; 20 MG/100ML; MG/100ML; MG/100ML; MG/100ML
INJECTION, SOLUTION INTRAVENOUS CONTINUOUS
OUTPATIENT
Start: 2023-12-04

## 2023-12-04 RX ORDER — LIDOCAINE HYDROCHLORIDE 10 MG/ML
INJECTION, SOLUTION EPIDURAL; INFILTRATION; INTRACAUDAL; PERINEURAL AS NEEDED
Status: DISCONTINUED | OUTPATIENT
Start: 2023-12-04 | End: 2023-12-04 | Stop reason: SURG

## 2023-12-04 RX ADMIN — LIDOCAINE HYDROCHLORIDE 50 MG: 10 INJECTION, SOLUTION EPIDURAL; INFILTRATION; INTRACAUDAL; PERINEURAL at 07:30:00

## 2023-12-04 RX ADMIN — SODIUM CHLORIDE, SODIUM LACTATE, POTASSIUM CHLORIDE, CALCIUM CHLORIDE: 600; 310; 30; 20 INJECTION, SOLUTION INTRAVENOUS at 07:35:00

## 2023-12-04 RX ADMIN — SODIUM CHLORIDE, SODIUM LACTATE, POTASSIUM CHLORIDE, CALCIUM CHLORIDE: 600; 310; 30; 20 INJECTION, SOLUTION INTRAVENOUS at 07:27:00

## 2023-12-04 NOTE — H&P
History & Physical Examination    Patient Name: Isaiah Stanley  MRN: [id number]  [facility]: [id number]  YOB: 1975    Diagnosis: screening for colon cancer    Medications Prior to Admission   Medication Sig Dispense Refill Last Dose    Na Sulfate-K Sulfate-Mg Sulf (SUPREP BOWEL PREP KIT) 17.5-3.13-1.6 GM/177ML Oral Solution Take as discussed in clinic 1 each 0     hydrocortisone 25 MG Rectal Suppos Place 1 suppository (25 mg total) rectally 2 (two) times daily for 14 days. (Patient not taking: Reported on 12/4/2023) 28 suppository 0 Not Taking     Current Facility-Administered Medications   Medication Dose Route Frequency    lactated ringers infusion   Intravenous Continuous       Allergies: No Known Allergies    Past Medical History:   Diagnosis Date    Prediabetes      Past Surgical History:   Procedure Laterality Date    HERNIA SURGERY       History reviewed. No pertinent family history. Social History     Tobacco Use    Smoking status: Never    Smokeless tobacco: Never   Substance Use Topics    Alcohol use: Yes     Alcohol/week: 0.0 standard drinks of alcohol     Comment: social       SYSTEM Check if Review is Normal Check if Physical Exam is Normal If not normal, please explain:   MARILEE Antony.Rack ] [ Amarilys Wheeler  Antony.Rack ] [ Jemma Jimenez Antony.Rack ] [ Shiraz Taylor Antony.Rack ] [ Justine Lineliya Antony.Rack ] [ Ivette Swanson Antony.Rack ] [ X]    OTHER        I have discussed the risks and benefits and alternatives of the procedure with the patient/family. They understand and agree to proceed with plan of care. I have reviewed the History and Physical done within the last 30 days. Any changes noted above.     Anamaria Napier MD  [address] [facility]

## 2023-12-04 NOTE — OPERATIVE REPORT
COLONOSCOPY REPORT    Roxane Irving     10/9/1975 Age 53year old   PCP Karli Curtis MD Endoscopist Mable Bliss MD       Date of procedure: 23    Procedure: Colonoscopy w/ cold snare polypectomy, cold forceps polypectomy    Pre-operative diagnosis: screening    Post-operative diagnosis: polyps, diverticulosis, hemorrhoids    Medications: MAC    Withdrawal time: 18 minutes    Procedure:  Informed consent was obtained from the patient after the risks of the procedure were discussed, including but not limited to bleeding, perforation, aspiration, infection, or possibility of a missed lesion. After discussions of the risks/benefits and alternatives to this procedure, as well as the planned sedation, the patient was placed in the left lateral decubitus position and begun on continuous blood pressure pulse oximetry and EKG monitoring and this was maintained throughout the procedure. Once an adequate level of sedation was obtained a digital rectal exam was completed. Then the lubricated tip of the [id number] diagnostic video colonoscope was inserted and advanced without difficulty to the cecum using water immersion and CO2 insufflation technique. The cecum was identified by localizing the trifold, the appendix and the ileocecal valve. Withdrawal was begun with thorough washing and careful examination of the colonic walls and folds. A routine second examination of the cecum/ascending colon was performed. Photodocumentation was obtained. The bowel prep was good. Views of the colon were good with washing. I then carefully withdrew the instrument from the patient who tolerated the procedure well. Complications: none. Findings:   1. 6 polyps noted as follows:      A. TWO -- 2 mm polyps in the ascending colon; sessile morphology; cold forceps polypectomy performed, polyps retrieved.       B. TWO -- 2 mm polyps in the transverse colon; sessile morphology; cold forceps polypectomy performed, polyps retrieved. C. 6 mm polyp in the transverse colon; sessile morphology; cold snare polypectomy performed, polyp retrieved. D. 2 mm polyp in the rectum; sessile morphology; cold forceps polypectomy performed, polyp retrieved. 2. Diverticulosis: small diverticula in the left colon. 3. Ileocecal valve appeared healthy and normal. Terminal ileum was intubated and appeared normal.    4. The colonic mucosa throughout the colon showed normal vascular pattern, without evidence of angioectasias or inflammation. 5. A retroflexed view of the rectum revealed small internal hemorrhoids. 6. MARIUSZ: normal rectal tone, no masses palpated. Impression:   6 polyps resected ranging in size from 2 mm to 6 mm. Normal terminal ileum. Diverticulosis. Small internal hemorrhoids. The colon was otherwise normal with glistening mucosa and intact vascular pattern. Recommend:  Await pathology. The interval for the next colonoscopy will be determined after reviewing pathology. If new signs or symptoms develop, colonoscopy may need to be repeated sooner. High fiber diet. Start daily fiber supplementation. Monitor for blood in the stool. If having more than just tinge of blood, call office or go to the ER.    >>>If tissue was obtained and you have not received your pathology results either by phone or letter within 2 weeks, please call our office at [phone].     Specimens: polyps    Blood loss: <1 ml

## 2023-12-04 NOTE — DISCHARGE INSTRUCTIONS

## 2023-12-04 NOTE — ANESTHESIA POSTPROCEDURE EVALUATION
Patient: Jitendra Christie    Procedure Summary       Date: 12/04/23 Room / Location: [facility] ENDOSCOPY 02 / [facility] 1 ENDO    Anesthesia Start: 2674 Anesthesia Stop: 3617    Procedure: COLONOSCOPY Diagnosis:       Colon cancer screening      (colon polyps; diverticulosis; small hemorrhoids)    Surgeons: Carri Serrano MD Anesthesiologist: Kee Gottron, DO    Anesthesia Type: MAC ASA Status: 2            Anesthesia Type: MAC    Vitals Value Taken Time   /76 12/04/23 0800   Temp  12/04/23 0801   Pulse 58 12/04/23 0801   Resp 18 12/04/23 0801   SpO2 97 % 12/04/23 0801   Vitals shown include unfiled device data.     [address] AN Post Evaluation:   Patient Evaluated in PACU  Patient Participation: complete - patient participated  Level of Consciousness: awake  Pain Management: adequate  Airway Patency:patent  Dental exam unchanged from preop  Yes    Cardiovascular Status: acceptable  Respiratory Status: acceptable  Postoperative Hydration acceptable      GEENA RUIZ DO  12/4/2023 8:01 AM

## 2023-12-08 ENCOUNTER — TELEPHONE (OUTPATIENT)
Facility: CLINIC | Age: 48
End: 2023-12-08

## 2023-12-08 NOTE — TELEPHONE ENCOUNTER
----- Message from Kelly Bonner MD sent at 12/8/2023  8:25 AM CST -----  5 adenomas removed on recent colonoscopy, needs repeat in 3 years. Mychart sent to patient. GI Staff:    Can you please place recall for this patient to have a colonoscopy in 3 years. Thank you.     Kelly Bonner MD

## 2023-12-08 NOTE — TELEPHONE ENCOUNTER
Colonoscopy recall for 3 years updated in health maintenance per Dr. Angel Olvera. Colonoscopy done 12/4/23. Pt outreach entered for patient to be contacted 9/4/26.

## 2023-12-08 NOTE — PROGRESS NOTES
5 adenomas removed on recent colonoscopy, needs repeat in 3 years. Mychart sent to patient. GI Staff:    Can you please place recall for this patient to have a colonoscopy in 3 years. Thank you.     Sarai Galaviz MD

## 2024-02-27 PROBLEM — R68.89 FLU-LIKE SYMPTOMS: Status: ACTIVE | Noted: 2024-02-27

## 2024-06-26 ENCOUNTER — OFFICE VISIT (OUTPATIENT)
Dept: FAMILY MEDICINE CLINIC | Facility: CLINIC | Age: 49
End: 2024-06-26

## 2024-06-26 VITALS
WEIGHT: 214 LBS | HEART RATE: 70 BPM | BODY MASS INDEX: 36 KG/M2 | DIASTOLIC BLOOD PRESSURE: 76 MMHG | SYSTOLIC BLOOD PRESSURE: 126 MMHG

## 2024-06-26 DIAGNOSIS — E55.9 VITAMIN D DEFICIENCY: ICD-10-CM

## 2024-06-26 DIAGNOSIS — Z00.00 WELL ADULT EXAM: Primary | ICD-10-CM

## 2024-06-26 DIAGNOSIS — R73.03 PREDIABETES: ICD-10-CM

## 2024-06-26 PROCEDURE — 3074F SYST BP LT 130 MM HG: CPT | Performed by: NURSE PRACTITIONER

## 2024-06-26 PROCEDURE — 3078F DIAST BP <80 MM HG: CPT | Performed by: NURSE PRACTITIONER

## 2024-06-26 PROCEDURE — 99396 PREV VISIT EST AGE 40-64: CPT | Performed by: NURSE PRACTITIONER

## 2024-06-26 NOTE — PROGRESS NOTES
HPI    Patient presents for annual physical.     Nocturia - negative.    Colonoscopy - 12/4/2023, 3 year recall.    Diet - has been making an effort to eat healthier after prediabetes dx last year.    Exercise - does not exercise regularly.  Feels like he has gained a lot of weight.  26 lb increase since last May.      Review of Systems   All other systems reviewed and are negative.       Vitals:    06/26/24 1445   BP: 126/76   Pulse: 70   Weight: 214 lb (97.1 kg)     Body mass index is 35.61 kg/m².    Health Maintenance   Topic Date Due    COVID-19 Vaccine (3 - 2023-24 season) 09/01/2023    Annual Depression Screening  01/01/2024    Annual Physical  05/27/2024    Influenza Vaccine (Season Ended) 10/01/2024    Colorectal Cancer Screening  12/04/2026    DTaP,Tdap,and Td Vaccines (3 - Td or Tdap) 01/25/2030    Pneumococcal Vaccine: Birth to 64yrs  Aged Out       Past Medical History:    Prediabetes       .  Past Surgical History:   Procedure Laterality Date    Colonoscopy N/A 12/4/2023    Procedure: COLONOSCOPY;  Surgeon: Konstantin Donaldson MD;  Location: [facility] ENDO    Hernia surgery         History reviewed. No pertinent family history.    Social History     Socioeconomic History    Marital status:      Spouse name: Not on file    Number of children: Not on file    Years of education: Not on file    Highest education level: Not on file   Occupational History    Not on file   Tobacco Use    Smoking status: Never    Smokeless tobacco: Never   Vaping Use    Vaping status: Never Used   Substance and Sexual Activity    Alcohol use: Yes     Alcohol/week: 0.0 standard drinks of alcohol     Comment: social    Drug use: No    Sexual activity: Not on file   Other Topics Concern    Left Handed Not Asked    Right Handed Yes    Currently spends a great deal of time in the sun Not Asked    Past Sunlamp Treatments for Acne Not Asked    History of tanning Not Asked    Hx of Spending Great Deal of Time in Sun Not Asked     Bad sunburns in the past Not Asked    Tanning Salons in the Past Not Asked    Hx of Radiation Treatments Not Asked    Regular use of sun block Not Asked   Social History Narrative    Not on file     Social Determinants of Health     Financial Resource Strain: Not on file   Food Insecurity: Not on file   Transportation Needs: Not on file   Physical Activity: Not on file   Stress: Not on file   Social Connections: Not on file   Housing Stability: Not on file       No current outpatient medications on file.       Allergies:  No Known Allergies    Physical Exam  Vitals and nursing note reviewed.   Constitutional:       General: He is not in acute distress.     Appearance: Normal appearance. He is well-developed. He is not ill-appearing, toxic-appearing or diaphoretic.   HENT:      Head: Normocephalic and atraumatic.      Right Ear: Hearing, tympanic membrane, ear canal and external ear normal. There is no impacted cerumen.      Left Ear: Hearing, tympanic membrane, ear canal and external ear normal. There is no impacted cerumen.      Nose: Nose normal. No congestion.      Mouth/Throat:      Mouth: Mucous membranes are moist.      Pharynx: Oropharynx is clear. No oropharyngeal exudate or posterior oropharyngeal erythema.   Eyes:      General:         Right eye: No discharge.         Left eye: No discharge.      Conjunctiva/sclera: Conjunctivae normal.   Neck:      Thyroid: No thyromegaly.   Cardiovascular:      Rate and Rhythm: Normal rate and regular rhythm.      Pulses: Normal pulses.      Heart sounds: Normal heart sounds. No murmur heard.  Pulmonary:      Effort: Pulmonary effort is normal. No respiratory distress.      Breath sounds: Normal breath sounds. No stridor. No wheezing, rhonchi or rales.   Chest:      Chest wall: No tenderness.   Abdominal:      General: Abdomen is flat. Bowel sounds are normal. There is no distension.      Palpations: Abdomen is soft. There is no mass.      Tenderness: There is no  abdominal tenderness. There is no guarding or rebound.      Hernia: No hernia is present.   Musculoskeletal:         General: Normal range of motion.      Cervical back: Normal range of motion and neck supple.   Lymphadenopathy:      Cervical: No cervical adenopathy.   Skin:     General: Skin is warm and dry.   Neurological:      Mental Status: He is alert and oriented to person, place, and time.   Psychiatric:         Mood and Affect: Mood normal.         Behavior: Behavior normal.         Thought Content: Thought content normal.         Judgment: Judgment normal.          Assessment and Plan:   Problem List Items Addressed This Visit    None  Visit Diagnoses       Well adult exam    -  Primary    Relevant Orders    Lipid Panel    Hemoglobin A1C    Comp Metabolic Panel (14)    CBC With Differential With Platelet    TSH W Reflex To Free T4    Vitamin D    Vitamin B12    Vitamin D deficiency        Relevant Orders    Vitamin D    Prediabetes        Relevant Orders    Hemoglobin A1C           Follow up for fasting labs.      Discussed plan of care with patient and patient is in agreement.  All questions answered. Patient to call with questions or concerns.    Encouraged to sign up for My Chart if not already registered.

## 2024-07-02 ENCOUNTER — LAB ENCOUNTER (OUTPATIENT)
Dept: LAB | Age: 49
End: 2024-07-02
Attending: NURSE PRACTITIONER
Payer: COMMERCIAL

## 2024-07-02 DIAGNOSIS — Z00.00 WELL ADULT EXAM: ICD-10-CM

## 2024-07-02 DIAGNOSIS — E55.9 VITAMIN D DEFICIENCY: ICD-10-CM

## 2024-07-02 DIAGNOSIS — R73.03 PREDIABETES: ICD-10-CM

## 2024-07-02 LAB
ALBUMIN SERPL-MCNC: 4.3 G/DL (ref 3.2–4.8)
ALBUMIN/GLOB SERPL: 1.9 {RATIO} (ref 1–2)
ALP LIVER SERPL-CCNC: 112 U/L
ALT SERPL-CCNC: 29 U/L
ANION GAP SERPL CALC-SCNC: 6 MMOL/L (ref 0–18)
AST SERPL-CCNC: 18 U/L (ref ?–34)
BASOPHILS # BLD AUTO: 0.08 X10(3) UL (ref 0–0.2)
BASOPHILS NFR BLD AUTO: 1.1 %
BILIRUB SERPL-MCNC: 0.3 MG/DL (ref 0.3–1.2)
BUN BLD-MCNC: 15 MG/DL (ref 9–23)
BUN/CREAT SERPL: 15.2 (ref 10–20)
CALCIUM BLD-MCNC: 9 MG/DL (ref 8.7–10.4)
CHLORIDE SERPL-SCNC: 109 MMOL/L (ref 98–112)
CHOLEST SERPL-MCNC: 182 MG/DL (ref ?–200)
CO2 SERPL-SCNC: 28 MMOL/L (ref 21–32)
CREAT BLD-MCNC: 0.99 MG/DL
DEPRECATED RDW RBC AUTO: 42.1 FL (ref 35.1–46.3)
EGFRCR SERPLBLD CKD-EPI 2021: 95 ML/MIN/1.73M2 (ref 60–?)
EOSINOPHIL # BLD AUTO: 0.47 X10(3) UL (ref 0–0.7)
EOSINOPHIL NFR BLD AUTO: 6.4 %
ERYTHROCYTE [DISTWIDTH] IN BLOOD BY AUTOMATED COUNT: 12.3 % (ref 11–15)
EST. AVERAGE GLUCOSE BLD GHB EST-MCNC: 146 MG/DL (ref 68–126)
FASTING PATIENT LIPID ANSWER: YES
FASTING STATUS PATIENT QL REPORTED: YES
GLOBULIN PLAS-MCNC: 2.3 G/DL (ref 2–3.5)
GLUCOSE BLD-MCNC: 144 MG/DL (ref 70–99)
HBA1C MFR BLD: 6.7 % (ref ?–5.7)
HCT VFR BLD AUTO: 42.4 %
HDLC SERPL-MCNC: 43 MG/DL (ref 40–59)
HGB BLD-MCNC: 14 G/DL
IMM GRANULOCYTES # BLD AUTO: 0.07 X10(3) UL (ref 0–1)
IMM GRANULOCYTES NFR BLD: 1 %
LDLC SERPL CALC-MCNC: 105 MG/DL (ref ?–100)
LYMPHOCYTES # BLD AUTO: 2.21 X10(3) UL (ref 1–4)
LYMPHOCYTES NFR BLD AUTO: 30.3 %
MCH RBC QN AUTO: 30.6 PG (ref 26–34)
MCHC RBC AUTO-ENTMCNC: 33 G/DL (ref 31–37)
MCV RBC AUTO: 92.8 FL
MONOCYTES # BLD AUTO: 0.6 X10(3) UL (ref 0.1–1)
MONOCYTES NFR BLD AUTO: 8.2 %
NEUTROPHILS # BLD AUTO: 3.86 X10 (3) UL (ref 1.5–7.7)
NEUTROPHILS # BLD AUTO: 3.86 X10(3) UL (ref 1.5–7.7)
NEUTROPHILS NFR BLD AUTO: 53 %
NONHDLC SERPL-MCNC: 139 MG/DL (ref ?–130)
OSMOLALITY SERPL CALC.SUM OF ELEC: 299 MOSM/KG (ref 275–295)
PLATELET # BLD AUTO: 229 10(3)UL (ref 150–450)
POTASSIUM SERPL-SCNC: 4 MMOL/L (ref 3.5–5.1)
PROT SERPL-MCNC: 6.6 G/DL (ref 5.7–8.2)
RBC # BLD AUTO: 4.57 X10(6)UL
SODIUM SERPL-SCNC: 143 MMOL/L (ref 136–145)
TRIGL SERPL-MCNC: 196 MG/DL (ref 30–149)
TSI SER-ACNC: 3.56 MIU/ML (ref 0.55–4.78)
VIT B12 SERPL-MCNC: >2000 PG/ML (ref 211–911)
VIT D+METAB SERPL-MCNC: 18.6 NG/ML (ref 30–100)
VLDLC SERPL CALC-MCNC: 33 MG/DL (ref 0–30)
WBC # BLD AUTO: 7.3 X10(3) UL (ref 4–11)

## 2024-07-02 PROCEDURE — 85025 COMPLETE CBC W/AUTO DIFF WBC: CPT

## 2024-07-02 PROCEDURE — 82607 VITAMIN B-12: CPT

## 2024-07-02 PROCEDURE — 84443 ASSAY THYROID STIM HORMONE: CPT

## 2024-07-02 PROCEDURE — 82306 VITAMIN D 25 HYDROXY: CPT

## 2024-07-02 PROCEDURE — 80061 LIPID PANEL: CPT

## 2024-07-02 PROCEDURE — 36415 COLL VENOUS BLD VENIPUNCTURE: CPT

## 2024-07-02 PROCEDURE — 83036 HEMOGLOBIN GLYCOSYLATED A1C: CPT

## 2024-07-02 PROCEDURE — 80053 COMPREHEN METABOLIC PANEL: CPT

## 2024-07-30 ENCOUNTER — OFFICE VISIT (OUTPATIENT)
Dept: FAMILY MEDICINE CLINIC | Facility: CLINIC | Age: 49
End: 2024-07-30

## 2024-07-30 VITALS
SYSTOLIC BLOOD PRESSURE: 127 MMHG | BODY MASS INDEX: 35.32 KG/M2 | HEIGHT: 65 IN | HEART RATE: 61 BPM | DIASTOLIC BLOOD PRESSURE: 79 MMHG | WEIGHT: 212 LBS

## 2024-07-30 DIAGNOSIS — E78.2 MIXED HYPERLIPIDEMIA: ICD-10-CM

## 2024-07-30 DIAGNOSIS — E11.9 TYPE 2 DIABETES MELLITUS WITHOUT COMPLICATION, WITHOUT LONG-TERM CURRENT USE OF INSULIN (HCC): Primary | ICD-10-CM

## 2024-07-30 DIAGNOSIS — E55.9 VITAMIN D DEFICIENCY: ICD-10-CM

## 2024-07-30 PROCEDURE — 99214 OFFICE O/P EST MOD 30 MIN: CPT | Performed by: NURSE PRACTITIONER

## 2024-07-30 PROCEDURE — 3074F SYST BP LT 130 MM HG: CPT | Performed by: NURSE PRACTITIONER

## 2024-07-30 PROCEDURE — 3044F HG A1C LEVEL LT 7.0%: CPT | Performed by: NURSE PRACTITIONER

## 2024-07-30 PROCEDURE — 3008F BODY MASS INDEX DOCD: CPT | Performed by: NURSE PRACTITIONER

## 2024-07-30 PROCEDURE — 3078F DIAST BP <80 MM HG: CPT | Performed by: NURSE PRACTITIONER

## 2024-07-30 RX ORDER — ATORVASTATIN CALCIUM 10 MG/1
10 TABLET, FILM COATED ORAL NIGHTLY
Qty: 90 TABLET | Refills: 3 | Status: SHIPPED | OUTPATIENT
Start: 2024-07-30

## 2024-07-30 RX ORDER — ERGOCALCIFEROL 1.25 MG/1
50000 CAPSULE ORAL WEEKLY
Qty: 12 CAPSULE | Refills: 3 | Status: SHIPPED | OUTPATIENT
Start: 2024-07-30

## 2024-07-30 RX ORDER — METFORMIN HYDROCHLORIDE 500 MG/1
500 TABLET, EXTENDED RELEASE ORAL
Qty: 90 TABLET | Refills: 3 | Status: SHIPPED | OUTPATIENT
Start: 2024-07-30

## 2024-07-30 RX ORDER — LISINOPRIL 5 MG/1
5 TABLET ORAL DAILY
Qty: 90 TABLET | Refills: 3 | Status: SHIPPED | OUTPATIENT
Start: 2024-07-30

## 2024-07-30 NOTE — PROGRESS NOTES
HPI    Patient presents for follow-up to discuss recent diagnosis of diabetes.      Review of Systems   All other systems reviewed and are negative.       Vitals:    07/30/24 1656   BP: 127/79   Pulse: 61   Weight: 212 lb (96.2 kg)   Height: 5' 5\" (1.651 m)     Body mass index is 35.28 kg/m².    Health Maintenance   Topic Date Due    COVID-19 Vaccine (3 - 2023-24 season) 09/01/2023    Influenza Vaccine (1) 10/01/2024    Annual Physical  06/26/2025    Colorectal Cancer Screening  12/04/2026    DTaP,Tdap,and Td Vaccines (3 - Td or Tdap) 01/25/2030    Annual Depression Screening  Completed    Pneumococcal Vaccine: Birth to 64yrs  Aged Out       Past Medical History:    Prediabetes       .  Past Surgical History:   Procedure Laterality Date    Colonoscopy N/A 12/4/2023    Procedure: COLONOSCOPY;  Surgeon: Konstantin Donaldson MD;  Location: [facility] ENDO    Hernia surgery         History reviewed. No pertinent family history.    Social History     Socioeconomic History    Marital status:      Spouse name: Not on file    Number of children: Not on file    Years of education: Not on file    Highest education level: Not on file   Occupational History    Not on file   Tobacco Use    Smoking status: Never    Smokeless tobacco: Never   Vaping Use    Vaping status: Never Used   Substance and Sexual Activity    Alcohol use: Yes     Alcohol/week: 0.0 standard drinks of alcohol     Comment: social    Drug use: No    Sexual activity: Not on file   Other Topics Concern    Left Handed Not Asked    Right Handed Yes    Currently spends a great deal of time in the sun Not Asked    Past Sunlamp Treatments for Acne Not Asked    History of tanning Not Asked    Hx of Spending Great Deal of Time in Sun Not Asked    Bad sunburns in the past Not Asked    Tanning Salons in the Past Not Asked    Hx of Radiation Treatments Not Asked    Regular use of sun block Not Asked   Social History Narrative    Not on file     Social Determinants of  Health     Financial Resource Strain: Not on file   Food Insecurity: Not on file   Transportation Needs: Not on file   Physical Activity: Not on file   Stress: Not on file   Social Connections: Not on file   Housing Stability: Not on file       Current Outpatient Medications   Medication Sig Dispense Refill    metFORMIN  MG Oral Tablet 24 Hr Take 1 tablet (500 mg total) by mouth daily with breakfast. 90 tablet 3    lisinopril 5 MG Oral Tab Take 1 tablet (5 mg total) by mouth daily. 90 tablet 3    atorvastatin 10 MG Oral Tab Take 1 tablet (10 mg total) by mouth nightly. 90 tablet 3    ergocalciferol 1.25 MG (06575 UT) Oral Cap Take 1 capsule (50,000 Units total) by mouth once a week. 12 capsule 3       Allergies:  No Known Allergies    Physical Exam  Vitals and nursing note reviewed.   Constitutional:       General: He is not in acute distress.     Appearance: Normal appearance.   Cardiovascular:      Rate and Rhythm: Normal rate and regular rhythm.      Heart sounds: Normal heart sounds.   Pulmonary:      Effort: Pulmonary effort is normal. No respiratory distress.      Breath sounds: Normal breath sounds. No stridor. No wheezing, rhonchi or rales.   Chest:      Chest wall: No tenderness.   Neurological:      Mental Status: He is alert and oriented to person, place, and time.          Assessment and Plan:   Problem List Items Addressed This Visit    None  Visit Diagnoses       Type 2 diabetes mellitus without complication, without long-term current use of insulin (HCC)    -  Primary    Relevant Medications    metFORMIN  MG Oral Tablet 24 Hr    lisinopril 5 MG Oral Tab    atorvastatin 10 MG Oral Tab    Other Relevant Orders    Diabetic Education - [place] [facility]    Vitamin D deficiency        Relevant Medications    metFORMIN  MG Oral Tablet 24 Hr    atorvastatin 10 MG Oral Tab    ergocalciferol 1.25 MG (95636 UT) Oral Cap    Mixed hyperlipidemia        Relevant Medications     metFORMIN  MG Oral Tablet 24 Hr    atorvastatin 10 MG Oral Tab           Metformin daily, atorvastatin daily, lisinopril daily, ergocalciferol once weekly.  Discussed diabetes education including diet and exercise.  Patient to follow-up for in-depth diabetes education class.  Follow-up in 6 months to repeat labs.    Discussed plan of care with patient and patient is in agreement.  All questions answered. Patient to call with questions or concerns.    Encouraged to sign up for My Chart if not already registered.

## (undated) DEVICE — SNARE ENDOSCOPIC 10MM ROUND

## (undated) DEVICE — KIT CLEAN ENDOKIT 1.1OZ GOWNX2

## (undated) DEVICE — FORCEPS BX L240CM DIA2.4MM L NDL RAD JAW 4

## (undated) DEVICE — LINE MNTR ADLT SET O2 INTMD

## (undated) DEVICE — 60 ML SYRINGE REGULAR TIP: Brand: MONOJECT

## (undated) DEVICE — SNARE OPTMZ PLPCTM TRP

## (undated) DEVICE — MEDI-VAC NON-CONDUCTIVE SUCTION TUBING 6MM X 1.8M (6FT.) L: Brand: CARDINAL HEALTH

## (undated) DEVICE — KIT ENDO ORCAPOD 160/180/190

## (undated) NOTE — Clinical Note
1/26/2017              [address] [place] [address]        [id number] [place] [id number]         Dear Dina Chen,      It was a pleasure to see you at our [place] , [address], [facility] office.   Your lab tests were normal.  There is no need fo

## (undated) NOTE — LETTER
05/10/19        [facility]      Dear Cristine Garzon,    [facility] records indicate that you have outstanding lab work and or testing that was ordered for you and has not yet been completed:  Orders Placed This Encounter

## (undated) NOTE — MR AVS SNAPSHOT
[address], [address]  [address] [facility]  [phone]               Thank you for choosing us for your health care visit with Khoa Alves MD.  We are glad to serve you and happy to provide you with this summa [facility]oc Dx: Lower abdominal pain [R10.30], Routine medical exam [Z00.00]                 Follow-up Instructions     Return if symptoms worsen or fail to improve. MyChart     Sign up for [employer], your secure online medical record.   [employer] will allo HOW TO GET STARTED: HOW TO STAY MOTIVATED:   Start activities slowly and build up over time Do what you like   Get your heart pumping – brisk walking, biking, swimming Even 10 minute increments are effective and add up over the week   2 ½ hours per week –

## (undated) NOTE — LETTER
Date & Time: 5/25/2023, 1:36 AM  Patient: Reginald Brock  Encounter Provider(s):    Felisha Douglass MD       To Whom It May Concern:    Reginald Brock was seen and treated in our department on 5/24/2023. He can return to work on 5/27/2023.     If you have any questions or concerns, please do not hesitate to call.        _____________________________  Physician/APC Signature

## (undated) NOTE — Clinical Note
Patient Name: Tamiko Arenas  : 10/9/1975  MRN: [id number]  Patient Address: [place] [address]        Enfermedad del Coronavirus 2019 (COVID-19)     [address] tiene un compromiso con la seguridad y [place] de nues públicos. Si usted tiene que salir, evite usar cualquier tipo de transporte público, desplazamiento compartido o taxis. 2. Vigile jonny síntomas con cuidado. Si jonny síntomas empeoran, llame de inmediato a jesus proveedor de [employer].   3. Descanse y per proveedor de [facility] puede ayudar a guiarle a [employer].     Si usted no ha estado expuesto o no tiene conocimiento de Gila Enrique expuesto al COVID-19, y está preocupado acerca de jonny síntomas, por favor contacte a jesus proveedor convalecientes es un componente de la kristy que, en personas que se meyer recuperado de COVID-19, contiene anticuerpos en contra del virus.  Los anticuerpos en el plasma pueden ser usados sandee tratamiento para pacientes en nuestra comunidad que meyer sido afec [place].cy  http://www.[facility].[place].gov/topics-services/diseases-and-conditions/diseases-a-z-list/coronavirus  https://www.cdc.gov/coronavirus/2019-nc

## (undated) NOTE — LETTER
2/3/2020          To Whom It May Concern:    Antonia Durbin is currently under my medical care and may not return to work at this time. Please excuse Massiel Hunger for 1 weeks. He may return to work on 2/10/2020. Activity is restricted as follows: none.

## (undated) NOTE — LETTER
Date & Time: 1/25/2020, 12:08 PM  Patient: Anum Fitzpatrick  Encounter Provider(s):    Kj Georges MD       To Whom It May Concern:    Anum Fitzpatrick was seen and treated in our department on 1/25/2020.  He can return to work with these limita

## (undated) NOTE — LETTER
[place], [address]  [address]  [address]  [address] [facility],  [address] [place] [address] Ave       02/12/22        Patient: Fredrick Caceres   YOB: 1975   Date of Visit: 2/12/2022       Dear  Dr. Nargis Gamez MD,      Thank you for referring Fredrick Caceres to my practice. Please find my assessment and plan below. ASSESSMENT AND PLAN    1. Throat pain    2. Left ear pain  Ear pain appears to be more TMJ related as he has palpable tenderness. This would explain the radiation to the temporal region and down the preauricular region as well. I have asked him to start cyclobenzaprine and Celebrex warm heat soft diet and to chew on both sides of his mouth. We will reevaluate him in 1 month. With respect to his throat symptoms and hoarseness this appears to be more related to reflux and postnasal discharge so he will start Singulair loratadine Astelin nasal spray and omeprazole return to see me in 1 month as well. Laryngoscopy reveals essentially erythema but no lesions or masses are present. Sincerely,   Darrell Red. Giuliana Harris MD   [address], [facility]  [address] [facility]  [address] [address]    Document electronically generated by:  Darrell Red.  Giuliana Harris MD

## (undated) NOTE — LETTER
20      Patient: Marian Preston  : 10/9/1975 Visit date: 2/3/2020    Dear Kimberly Patel,      I examined your patient in consultation today. He has a dorsal left hand wound with a small superficial radial dehiscence.   This should h

## (undated) NOTE — LETTER
7/12/2017          To Whom It May Concern:    Will Alcantar is currently under my medical care and may not return to work at this time. Please excuse Yony Bhardwaj for 1 days. He may return to work on 7/13/17. Activity is restricted as follows: none.

## (undated) NOTE — LETTER
[address]  [address] [facility] [place]  Authorization for Surgical Operation and Procedure                                                                                           I hereby authorize Adam Choudhary MD, my physician and his/her assistants (if applicable), which may include medical students, residents, and/or fellows, to perform the following surgical operation/ procedure and administer such anesthesia as may be determined necessary by my physician: Operation/Procedure name (s) COLONOSCOPY on Amanda Erps   2. I recognize that during the surgical operation/procedure, unforeseen conditions may necessitate additional or different procedures than those listed above. I, therefore, further authorize and request that the above-named surgeon, assistants, or designees perform such procedures as are, in their judgment, necessary and desirable. 3.   My surgeon/physician has discussed prior to my surgery the potential benefits, risks and side effects of this procedure; the likelihood of achieving goals; and potential problems that might occur during recuperation. They also discussed reasonable alternatives to the procedure, including risks, benefits, and side effects related to the alternatives and risks related to not receiving this procedure. I have had all my questions answered and I acknowledge that no guarantee has been made as to the result that may be obtained. 4.   Should the need arise during my operation/procedure, which includes change of level of care prior to discharge, I also consent to the administration of blood and/or blood products. Further, I understand that despite careful testing and screening of blood or blood products by collecting agencies, I may still be subject to ill effects as a result of receiving a blood transfusion and/or blood products.   The following are some, but not all, of the potential risks that can occur: fever and allergic reactions, hemolytic reactions, transmission of diseases such as Hepatitis, AIDS and Cytomegalovirus (CMV) and fluid overload. In the event that I wish to have an autologous transfusion of my own blood, or a directed donor transfusion, I will discuss this with my physician. Check only if Refusing Blood or Blood Products  I understand refusal of blood or blood products as deemed necessary by my physician may have serious consequences to my condition to include possible death. I hereby assume responsibility for my refusal and release the hospital, its personnel, and my physicians from any responsibility for the consequences of my refusal.    o  Refuse   5. I authorize the use of any specimen, organs, tissues, body parts or foreign objects that may be removed from my body during the operation/procedure for diagnosis, research or teaching purposes and their subsequent disposal by hospital authorities. I also authorize the release of specimen test results and/or written reports to my treating physician on the hospital medical staff or other referring or consulting physicians involved in my care, at the discretion of the Pathologist or my treating physician. 6.   I consent to the photographing or videotaping of the operations or procedures to be performed, including appropriate portions of my body for medical, scientific, or educational purposes, provided my identity is not revealed by the pictures or by descriptive texts accompanying them. If the procedure has been photographed/videotaped, the surgeon will obtain the original picture, image, videotape or CD. The hospital will not be responsible for storage, release or maintenance of the picture, image, tape or CD.    7.   I consent to the presence of a  or observers in the operating room as deemed necessary by my physician or their designees.     8.   I recognize that in the event my procedure results in extended X-Ray/fluoroscopy time, I may develop a skin reaction. 9. If I have a Do Not Attempt Resuscitation (DNAR) order in place, that status will be suspended while in the operating room, procedural suite, and during the recovery period unless otherwise explicitly stated by me (or a person authorized to consent on my behalf). The surgeon or my attending physician will determine when the applicable recovery period ends for purposes of reinstating the DNAR order. 10. Patients having a sterilization procedure: I understand that if the procedure is successful the results will be permanent and it will therefore be impossible for me to inseminate, conceive, or bear children. I also understand that the procedure is intended to result in sterility, although the result has not been guaranteed. 11. I acknowledge that my physician has explained sedation/analgesia administration to me including the risk and benefits I consent to the administration of sedation/analgesia as may be necessary or desirable in the judgment of my physician. I CERTIFY THAT I HAVE READ AND FULLY UNDERSTAND THE ABOVE CONSENT TO OPERATION and/or OTHER PROCEDURE.     _________________________________________ _________________________________     ___________________________________  Signature of Patient     Signature of Responsible Person                   Printed Name of Responsible Person                              _________________________________________ ______________________________        ___________________________________  Signature of Witness         Date  Time         Relationship to Patient    STATEMENT OF PHYSICIAN My signature below affirms that prior to the time of the procedure; I have explained to the patient and/or his/her legal representative, the risks and benefits involved in the proposed treatment and any reasonable alternative to the proposed treatment.  I have also explained the risks and benefits involved in refusal of the proposed treatment and alternatives to the proposed treatment and have answered the patient's questions.  If I have a significant financial interest in a co-management agreement or a significant financial interest in any product or implant, or other significant relationship used in this procedure/surgery, I have disclosed this and had a discussion with my patient.     _______________________________________________________________ _____________________________  (Signature of Physician)                                                                                         (Date)                                   (Time)  Patient Name: Palma Cristina    : 10/9/1975   Printed: 2023      Medical Record #: [id number]                                              Page 1 of 1

## (undated) NOTE — ED AVS SNAPSHOT
Lazaro Gottlieb   MRN: [id number]    Department:  [facility] Emergency Department   Date of Visit:  1/25/2020           Disclosure     Insurance plans vary and the physician(s) referred by the ER may not be covered by your plan.  Please cont CARE PHYSICIAN AT ONCE OR RETURN IMMEDIATELY TO THE EMERGENCY DEPARTMENT. If you have been prescribed any medication(s), please fill your prescription right away and begin taking the medication(s) as directed.   If you believe that any of the medications